# Patient Record
Sex: FEMALE | Race: WHITE | URBAN - METROPOLITAN AREA
[De-identification: names, ages, dates, MRNs, and addresses within clinical notes are randomized per-mention and may not be internally consistent; named-entity substitution may affect disease eponyms.]

---

## 2023-08-03 ENCOUNTER — OFFICE VISIT (OUTPATIENT)
Facility: CLINIC | Age: 38
End: 2023-08-03
Payer: COMMERCIAL

## 2023-08-03 VITALS
OXYGEN SATURATION: 98 % | BODY MASS INDEX: 23.46 KG/M2 | DIASTOLIC BLOOD PRESSURE: 72 MMHG | HEART RATE: 81 BPM | HEIGHT: 66.14 IN | WEIGHT: 146 LBS | SYSTOLIC BLOOD PRESSURE: 118 MMHG

## 2023-08-03 DIAGNOSIS — Z12.4 PAP SMEAR FOR CERVICAL CANCER SCREENING: ICD-10-CM

## 2023-08-03 DIAGNOSIS — Z00.00 ENCOUNTER FOR ROUTINE ADULT HEALTH EXAMINATION WITHOUT ABNORMAL FINDINGS: Primary | ICD-10-CM

## 2023-08-03 PROCEDURE — 99385 PREV VISIT NEW AGE 18-39: CPT | Performed by: FAMILY MEDICINE

## 2023-08-03 PROCEDURE — 87624 HPV HI-RISK TYP POOLED RSLT: CPT | Performed by: FAMILY MEDICINE

## 2023-08-03 PROCEDURE — 3078F DIAST BP <80 MM HG: CPT | Performed by: FAMILY MEDICINE

## 2023-08-03 PROCEDURE — 3074F SYST BP LT 130 MM HG: CPT | Performed by: FAMILY MEDICINE

## 2023-08-03 PROCEDURE — 3008F BODY MASS INDEX DOCD: CPT | Performed by: FAMILY MEDICINE

## 2023-08-04 LAB — HPV I/H RISK 1 DNA SPEC QL NAA+PROBE: NEGATIVE

## 2023-08-18 ENCOUNTER — LAB ENCOUNTER (OUTPATIENT)
Dept: LAB | Facility: REFERENCE LAB | Age: 38
End: 2023-08-18
Attending: FAMILY MEDICINE
Payer: COMMERCIAL

## 2023-08-18 DIAGNOSIS — Z00.00 ENCOUNTER FOR ROUTINE ADULT HEALTH EXAMINATION WITHOUT ABNORMAL FINDINGS: ICD-10-CM

## 2023-08-18 LAB
ALBUMIN SERPL-MCNC: 3.7 G/DL (ref 3.4–5)
ALBUMIN/GLOB SERPL: 1.1 {RATIO} (ref 1–2)
ALP LIVER SERPL-CCNC: 63 U/L
ALT SERPL-CCNC: 26 U/L
ANION GAP SERPL CALC-SCNC: 4 MMOL/L (ref 0–18)
AST SERPL-CCNC: 14 U/L (ref 15–37)
BASOPHILS # BLD AUTO: 0.09 X10(3) UL (ref 0–0.2)
BASOPHILS NFR BLD AUTO: 1.2 %
BILIRUB SERPL-MCNC: 0.8 MG/DL (ref 0.1–2)
BUN BLD-MCNC: 15 MG/DL (ref 7–18)
BUN/CREAT SERPL: 17.4 (ref 10–20)
CALCIUM BLD-MCNC: 8.8 MG/DL (ref 8.5–10.1)
CHLORIDE SERPL-SCNC: 111 MMOL/L (ref 98–112)
CHOLEST SERPL-MCNC: 158 MG/DL (ref ?–200)
CO2 SERPL-SCNC: 27 MMOL/L (ref 21–32)
CREAT BLD-MCNC: 0.86 MG/DL
DEPRECATED RDW RBC AUTO: 45.5 FL (ref 35.1–46.3)
EGFRCR SERPLBLD CKD-EPI 2021: 89 ML/MIN/1.73M2 (ref 60–?)
EOSINOPHIL # BLD AUTO: 0.2 X10(3) UL (ref 0–0.7)
EOSINOPHIL NFR BLD AUTO: 2.8 %
ERYTHROCYTE [DISTWIDTH] IN BLOOD BY AUTOMATED COUNT: 13.3 % (ref 11–15)
EST. AVERAGE GLUCOSE BLD GHB EST-MCNC: 111 MG/DL (ref 68–126)
FASTING PATIENT LIPID ANSWER: NO
FASTING STATUS PATIENT QL REPORTED: NO
GLOBULIN PLAS-MCNC: 3.4 G/DL (ref 2.8–4.4)
GLUCOSE BLD-MCNC: 114 MG/DL (ref 70–99)
HBA1C MFR BLD: 5.5 % (ref ?–5.7)
HCT VFR BLD AUTO: 37.2 %
HDLC SERPL-MCNC: 48 MG/DL (ref 40–59)
HGB BLD-MCNC: 12.2 G/DL
IMM GRANULOCYTES # BLD AUTO: 0.01 X10(3) UL (ref 0–1)
IMM GRANULOCYTES NFR BLD: 0.1 %
LAST PAP RESULT: NORMAL
LDLC SERPL CALC-MCNC: 80 MG/DL (ref ?–100)
LYMPHOCYTES # BLD AUTO: 2.84 X10(3) UL (ref 1–4)
LYMPHOCYTES NFR BLD AUTO: 39.2 %
MCH RBC QN AUTO: 30.4 PG (ref 26–34)
MCHC RBC AUTO-ENTMCNC: 32.8 G/DL (ref 31–37)
MCV RBC AUTO: 92.8 FL
MONOCYTES # BLD AUTO: 0.47 X10(3) UL (ref 0.1–1)
MONOCYTES NFR BLD AUTO: 6.5 %
NEUTROPHILS # BLD AUTO: 3.64 X10 (3) UL (ref 1.5–7.7)
NEUTROPHILS # BLD AUTO: 3.64 X10(3) UL (ref 1.5–7.7)
NEUTROPHILS NFR BLD AUTO: 50.2 %
NONHDLC SERPL-MCNC: 110 MG/DL (ref ?–130)
OSMOLALITY SERPL CALC.SUM OF ELEC: 296 MOSM/KG (ref 275–295)
PAP HISTORY (OTHER THAN LAST PAP): NORMAL
PLATELET # BLD AUTO: 193 10(3)UL (ref 150–450)
POTASSIUM SERPL-SCNC: 4.1 MMOL/L (ref 3.5–5.1)
PROT SERPL-MCNC: 7.1 G/DL (ref 6.4–8.2)
RBC # BLD AUTO: 4.01 X10(6)UL
SODIUM SERPL-SCNC: 142 MMOL/L (ref 136–145)
TRIGL SERPL-MCNC: 179 MG/DL (ref 30–149)
VLDLC SERPL CALC-MCNC: 28 MG/DL (ref 0–30)
WBC # BLD AUTO: 7.3 X10(3) UL (ref 4–11)

## 2023-08-18 PROCEDURE — 36415 COLL VENOUS BLD VENIPUNCTURE: CPT

## 2023-08-18 PROCEDURE — 85025 COMPLETE CBC W/AUTO DIFF WBC: CPT

## 2023-08-18 PROCEDURE — 80053 COMPREHEN METABOLIC PANEL: CPT

## 2023-08-18 PROCEDURE — 83036 HEMOGLOBIN GLYCOSYLATED A1C: CPT

## 2023-08-18 PROCEDURE — 80061 LIPID PANEL: CPT

## 2023-11-29 ENCOUNTER — NURSE TRIAGE (OUTPATIENT)
Facility: CLINIC | Age: 38
End: 2023-11-29

## 2023-11-29 DIAGNOSIS — Z86.39 HISTORY OF THYROID DISEASE: Primary | ICD-10-CM

## 2023-11-29 NOTE — TELEPHONE ENCOUNTER
Action Requested: Summary for Provider     []  Critical Lab, Recommendations Needed  [x] Need Additional Advice  []   FYI    [x]   Need Orders  [] Need Medications Sent to Pharmacy  []  Other     SUMMARY: Per Protocol, See Within 2 Weeks in Office. Patient calling asking if can have an order for TSH blood draw. Very recently had a miscarriage and feels that thyroid may be off. **Patient Datanyzehart message below** Complains of Joint pain to hands, feet, lower back with occasional numbness/tingling to hands and feet. Also complains of Nerve pain to \"ball joint\" of right hand-- when flares rates pain 5/10. 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 Call back or go straight to ER if s/sx worsen questions or concerns. Patient verbalized understanding and agrees with plan. *Last TSH 2.22 on 09/29/23 (Care Everywhere)    Reason for call: Thyroid Problem  Onset: 2-3 months    Denies heart palpitations, sleeping problems, muscle aches/pains, changes in bowels, hair loss, redness or swelling to joints, radiating pain    Has not tried any over the counter medication    From: Seferino Booth  To: Mirna Wesley  Sent: 11/27/2023 10:57 AM CST  Subject: TSH check    Hi Dr. Mayse Odor been experiencing some joint pain, more pronounced back pain etc. I also just had a miscarriage. My TSH level before the miscarriage was at 2.22, which I know is normal for the first trimester. It was, however, higher than my first trimester TSH during my first pregnancy in 2021. So I'm a little worried that something is not quite right with my thyroid and was wondering if I could get it checked out. Or maybe this is just the annoying part of getting older, lol?   Thanks,  Pau Guerrero        Reason for Disposition   Hand or wrist pain is a chronic symptom (recurrent or ongoing AND lasting > 4 weeks)    Protocols used: Hand and Wrist Pain-A-[place]

## 2023-11-30 NOTE — TELEPHONE ENCOUNTER
Jose seen     TSH and message from Dr. Camilo Ralph [phone]  From  Zoe Kent RN To  Joyce Mckinnon and Delivered  11/30/2023  7:43 AM   Last Read in [address]  11/30/2023 12:57 PM by Kami Merida

## 2023-12-02 ENCOUNTER — LAB ENCOUNTER (OUTPATIENT)
Dept: LAB | Facility: HOSPITAL | Age: 38
End: 2023-12-02
Attending: FAMILY MEDICINE
Payer: COMMERCIAL

## 2023-12-02 DIAGNOSIS — Z86.39 HISTORY OF THYROID DISEASE: ICD-10-CM

## 2023-12-02 LAB — TSI SER-ACNC: 1.7 MIU/ML (ref 0.55–4.78)

## 2023-12-02 PROCEDURE — 36415 COLL VENOUS BLD VENIPUNCTURE: CPT

## 2023-12-02 PROCEDURE — 84443 ASSAY THYROID STIM HORMONE: CPT

## 2023-12-07 ENCOUNTER — NURSE TRIAGE (OUTPATIENT)
Facility: CLINIC | Age: 38
End: 2023-12-07

## 2023-12-07 ENCOUNTER — OFFICE VISIT (OUTPATIENT)
Dept: INTERNAL MEDICINE CLINIC | Facility: CLINIC | Age: 38
End: 2023-12-07
Payer: COMMERCIAL

## 2023-12-07 VITALS
WEIGHT: 155 LBS | HEART RATE: 71 BPM | SYSTOLIC BLOOD PRESSURE: 117 MMHG | BODY MASS INDEX: 24.91 KG/M2 | DIASTOLIC BLOOD PRESSURE: 77 MMHG | HEIGHT: 66.14 IN | OXYGEN SATURATION: 98 %

## 2023-12-07 DIAGNOSIS — M54.6 ACUTE LEFT-SIDED THORACIC BACK PAIN: Primary | ICD-10-CM

## 2023-12-07 PROCEDURE — 99213 OFFICE O/P EST LOW 20 MIN: CPT | Performed by: NURSE PRACTITIONER

## 2023-12-07 PROCEDURE — 3008F BODY MASS INDEX DOCD: CPT | Performed by: NURSE PRACTITIONER

## 2023-12-07 PROCEDURE — 3074F SYST BP LT 130 MM HG: CPT | Performed by: NURSE PRACTITIONER

## 2023-12-07 PROCEDURE — 3078F DIAST BP <80 MM HG: CPT | Performed by: NURSE PRACTITIONER

## 2023-12-07 RX ORDER — VALACYCLOVIR HYDROCHLORIDE 1 G/1
1000 TABLET, FILM COATED ORAL AS NEEDED
COMMUNITY

## 2023-12-07 RX ORDER — METHYLPREDNISOLONE 4 MG/1
TABLET ORAL
Qty: 1 EACH | Refills: 0 | Status: SHIPPED | OUTPATIENT
Start: 2023-12-07

## 2023-12-07 RX ORDER — CHOLECALCIFEROL (VITAMIN D3) 25 MCG
1 TABLET,CHEWABLE ORAL DAILY
COMMUNITY

## 2023-12-07 RX ORDER — MULTIVIT-MIN/IRON/FOLIC ACID/K 18-600-40
CAPSULE ORAL
COMMUNITY

## 2023-12-07 RX ORDER — CYCLOBENZAPRINE HCL 10 MG
10 TABLET ORAL NIGHTLY
Qty: 15 TABLET | Refills: 0 | Status: SHIPPED | OUTPATIENT
Start: 2023-12-07 | End: 2023-12-22

## 2023-12-07 NOTE — TELEPHONE ENCOUNTER
Action Requested: Summary for Provider     []  Critical Lab, Recommendations Needed  [] Need Additional Advice  []   FYI    []   Need Orders  [] Need Medications Sent to Pharmacy  []  Other     SUMMARY: patient reports upper back pain radiating to her neck and shoulders. States she noticed the pain after her workout and the pain has become more intense. Patient requesting to be seen by provider. No available appts with provider; however patient agreed to see available NP for an evaluation.    Future Appointments   Date Time Provider Nidhi Alexia   12/7/2023  2:40 PM JOSE G Pandya Asa       Reason for call: Back Pain  Onset: 3 days ago      Reason for Disposition   MODERATE back pain (e.g., interferes with normal activities) and present > 3 days    Protocols used: Back Pain-A-[place]

## 2023-12-07 NOTE — PATIENT INSTRUCTIONS
Plan  1) Ice back for 15 to 20 minutes four times per day. 2) Rest  3) Cyclobenzaprine 10 mg at [facility] May cause drowsiness and may only be able to take it at night. Do not drive while taking this medications. 4) Medrol Eliseo  5) Water with lemon  6) Alkaline foods  7) Ess[employer]ia water  8) Tennis Ball- Roll  9) Sleep with a rolled towel under your neck, no pillow, on your back on a firm mattress     If symptoms do not improve please give the office a call.

## 2023-12-07 NOTE — ASSESSMENT & PLAN NOTE
Back and neck pain. Increased stress with having a miscarriage. She has been eating a high sugar diet lately. Plan  1) Ice back for 15 to 20 minutes four times per day. 2) Rest  3) Cyclobenzaprine 10 mg at [facility] May cause drowsiness and may only be able to take it at night. Do not drive while taking this medications. 4) Medrol Eliseo  5) Water with lemon  6) Alkaline foods  7) Essentia water  8) Tennis Ball- Roll  9) Sleep with a rolled towel under your neck, no pillow, on your back on a firm mattress     If symptoms do not improve please give the office a call.

## 2024-02-02 ENCOUNTER — TELEPHONE (OUTPATIENT)
Facility: CLINIC | Age: 39
End: 2024-02-02

## 2024-02-02 NOTE — TELEPHONE ENCOUNTER
Verified name and .    Patient is requesting appointment to be seen by Dr. Liu only for cough and congestion.    She denies any difficulty breathing or chest pain at this time- able to speak full sentences with no issues.    Appointment scheduled:  Future Appointments   Date Time Provider Department Center   2024 10:30 AM Tosha Liu DO [facility]       Patient as advised to go to walk-in clinic or immediate care for any worsening of symptoms.

## 2024-02-05 ENCOUNTER — OFFICE VISIT (OUTPATIENT)
Facility: CLINIC | Age: 39
End: 2024-02-05
Payer: COMMERCIAL

## 2024-02-05 VITALS
HEIGHT: 66.14 IN | HEART RATE: 73 BPM | DIASTOLIC BLOOD PRESSURE: 70 MMHG | WEIGHT: 153 LBS | BODY MASS INDEX: 24.59 KG/M2 | OXYGEN SATURATION: 99 % | SYSTOLIC BLOOD PRESSURE: 116 MMHG

## 2024-02-05 DIAGNOSIS — R09.81 NASAL CONGESTION: ICD-10-CM

## 2024-02-05 DIAGNOSIS — R05.1 ACUTE COUGH: Primary | ICD-10-CM

## 2024-02-05 PROCEDURE — 99213 OFFICE O/P EST LOW 20 MIN: CPT | Performed by: FAMILY MEDICINE

## 2024-02-05 PROCEDURE — 90686 IIV4 VACC NO PRSV 0.5 ML IM: CPT | Performed by: FAMILY MEDICINE

## 2024-02-05 PROCEDURE — 90471 IMMUNIZATION ADMIN: CPT | Performed by: FAMILY MEDICINE

## 2024-02-05 RX ORDER — FLUTICASONE PROPIONATE 50 MCG
2 SPRAY, SUSPENSION (ML) NASAL DAILY
Qty: 18 ML | Refills: 0 | Status: SHIPPED | OUTPATIENT
Start: 2024-02-05

## 2024-02-05 NOTE — PROGRESS NOTES
CC:    Chief Complaint   Patient presents with    Cough     Cough any congestion since the middle of January.       HPI: 38 year old female here with persistent cough and congestion.  Reports she had frequent bronchitis infections as a child, but has felt like she has been sick constantly this winter.  She worked out last Monday night thinking she was better, but she could not sleep that night due to severe coughing and congestion.  Took an expectorant and Theraflu as well, but that has not helped.  She did have a few days where her breathing was heavier.   Has not had any wheezing.   She did get diagnosed with asthma while pregnant, but this did not persist after pregnancy.     ROS:  General:  No fevers, fatigue, no weight changes  HEENT:  Mild nasal congestion and runny nose, no sore throat   Cardio:  No chest pain  Pulmonary:  Cough and chest congestion, no wheezing, intermittent shortness of breath   GI:  No N/V/D, no abdominal pain   Dermatologic:  No rashes    Past Medical History:   Diagnosis Date    Asthma Fall 2021       Social History     Socioeconomic History    Marital status:      Spouse name: Not on file    Number of children: Not on file    Years of education: Not on file    Highest education level: Not on file   Occupational History    Not on file   Tobacco Use    Smoking status: Never    Smokeless tobacco: Never   Vaping Use    Vaping Use: Never used   Substance and Sexual Activity    Alcohol use: Not Currently     Comment: Very rarely do i have some alcohol    Drug use: Never    Sexual activity: Yes     Partners: Male   Other Topics Concern    Caffeine Concern No    Special Diet No    Weight Concern No    Exercise Yes     Comment: Once or twice a week   Social History Narrative    Not on file     Social Determinants of Health     Financial Resource Strain: Not on file   Food Insecurity: Not on file   Transportation Needs: Not on file   Physical Activity: Not on file   Stress: Not on file    Social Connections: Not on file   Housing Stability: Not on file       Current Outpatient Medications   Medication Sig Dispense Refill    prenatal vitamin with DHA 27-0.8-228 MG Oral Cap Take 1 capsule by mouth daily.      Cholecalciferol (VITAMIN D) 50 MCG (2000 UT) Oral Cap Take by mouth.      valACYclovir 1 G Oral Tab Take 1 tablet (1,000 mg total) by mouth as needed.         Patient has no known allergies.      Vitals:   Vitals:    02/05/24 1031   BP: 116/70   Pulse: 73   SpO2: 99%   Weight: 153 lb (69.4 kg)   Height: 5' 6.14\" (1.68 m)       Body mass index is 24.59 kg/m².    Physical:  General:  Alert, appropriate, no acute distress   HEENT: supple, no tonsillar erythema or exudate, no lymphadenopathy, no rhinorrhea, no sinus tenderness   Cardio:  RRR, no murmurs, S1, S2  Pulmonary:  Clear bilaterally, good air entry, no wheezing, no crackles, no rhonchi   Dermatologic:  No rashes or lesions    Assessment and Plan: 38 year old female here with acute cough and nasal congestion likely due to viral respiratory infection.     1. Acute cough    - Likely due to bronchitis or bronchiolitis without any concerning findings on lung exam today  - Recommend Flonase for possible upper airway cough syndrome and instructions on use given  - Also to consider Prednisone 40 mg daily x 5 days if not improving or worsening in the next week    2. Nasal congestion    - Trial Flonase as above       Tosha Liu DO  02/05/24  10:35 AM

## 2024-04-05 ENCOUNTER — NURSE TRIAGE (OUTPATIENT)
Facility: CLINIC | Age: 39
End: 2024-04-05

## 2024-04-05 ENCOUNTER — PATIENT MESSAGE (OUTPATIENT)
Facility: CLINIC | Age: 39
End: 2024-04-05

## 2024-04-05 NOTE — TELEPHONE ENCOUNTER
Spoke with the patient,verified full name and     Informed her of message and instructions below:    Will start treatment tomorrow if the hives do not go away on there own.    Will call back if she needs appointment

## 2024-04-05 NOTE — TELEPHONE ENCOUNTER
Note she sent an updated message that she saw her allergist and was prescribed Prednisone for possible hives. Only needs to see me if rash does not resolve with treatment, but can find her a slot if needed.

## 2024-04-05 NOTE — TELEPHONE ENCOUNTER
Action Requested: Summary for Provider     []  Critical Lab, Recommendations Needed  [x] Need Additional Advice  []   FYI    []   Need Orders  [] Need Medications Sent to Pharmacy  []  Other     SUMMARY: Patient calling today, has \"blood filled dots\" on hands arms and legs. She had a few of them but after seeing allergy dept yesterday and having allergy testing done this got worse and there are more. She was given new allergy medication to take which she has taken 2 doses.     Patient agreeable to being seen today apt scheduled  with provider in [place] but she would like to see Dr. Liu in next week to follow up. No openings and she asked I send message to see if anywhere she can be fit in.     Advised may not get response on this today, may wait for outcome of visit today with provider to decide next steps.     Also advised to call allergy provider to inform of these dots since seen yesterday and worse today.     Reason for call: Rash  Onset: yesterday but many more today      Reason for Disposition   Purple or blood-colored spots or dots (no fever and sounds well to triager)    Protocols used: Rash or Redness - Widespread-A-[place]

## 2024-06-25 ENCOUNTER — NURSE TRIAGE (OUTPATIENT)
Facility: CLINIC | Age: 39
End: 2024-06-25

## 2024-06-25 NOTE — TELEPHONE ENCOUNTER
Patient contacted. Verified name and date of birth.  Informed her of Dr. Liu's message and verbalized understanding.

## 2024-06-25 NOTE — TELEPHONE ENCOUNTER
Noted and agree with plan of care. Augmentin is a good choice for possible pyelonephritis, but if symptoms not improving or worsening after 48 hours should be seen in the office or go to the ED if symptoms severe.

## 2024-06-25 NOTE — TELEPHONE ENCOUNTER
Did she contact her OB/GYN since they are treating her for this? I don't see any results in the system and would be helpful to be able to review notes or any culture results.

## 2024-06-25 NOTE — TELEPHONE ENCOUNTER
Patient started ABT last evening. She did talk with the OB/GYNE about symptoms she was told it may not be related to UTI she is being treated for. I advised that she could call them back to see about ordering any additional testing based on symptoms which she may do.     I offered appt with Dr. Jarvis today which she declined for now.     Patient is taking tylenol. She expressed she is going to give the ABT a day or two to work and if not improving or symptoms worsen she will call us back or seek care at IC or ER pending symptoms.     Pain is manageable now. C/o back pain that goes to right side today. Was going to left yesterday. No fever, nausea, vomiting. Able to pass urine ok, no visible blood in urine and not passing any clots. No HX of kidney stones, discuss symptoms and when to seek care if develop. ER warning sings given and patient states understanding.     Patient will wait this out a bit and call back if not improving to review symptoms and determine next steps.

## 2024-06-25 NOTE — TELEPHONE ENCOUNTER
Action Requested: Summary for Provider     []  Critical Lab, Recommendations Needed  [x] Need Additional Advice  []   FYI    []   Need Orders  [] Need Medications Sent to Pharmacy  []  Other     SUMMARY: Per protocol, patient should be seen in the office today. Patient unable to come to the office from 12-1:00 pm and 2:30-3:30 pm. Otherwise, can come any time if advised to do so.    Dr. Liu please advise.     Reason for call: Urinary Symptoms (/)  Onset: 6/20    Patient reports of urinary symptoms of frequency, urgency, hesitancy, and burning sensation before and after urination that started on Thursday evening. She drank lots of water and felt better the next day. She still experienced symptoms more in the evening than morning at that time.    She started experiencing back pain, left side, on Sunday. Saw OB-GYN yesterday. Urine was tested and was started on Augmentin (first dose last night). States pain migrated to the right side and mid to lower back during visit to OB-GYN.    Pain is bad enough that she ended up taking Tylenol. Certain movements such as lifting one foot up when standing to put a sock on or weird sitting positions aggravated the pain, \"shooting pain.\"    She has been taking cranberry pills as advised by OB-GYN. Currently remains to have frequency and burning sensation, urgency and hesitancy almost resolved. Denies blood in the urine, states it was only noted on urine testing.     Of note, states menstrual cycle was long - 34 days, and that she might be pregnant but too early to test. Also dealing with allergy symptoms and was put on Allegra but apparently was allergic to it causing hives; seen an allergist. Allergies to cats, grass and anti-histamines.    Reason for Disposition   Side (flank) or lower back pain present    Protocols used: Urinary Symptoms-A-[place]

## 2024-06-26 ENCOUNTER — PATIENT MESSAGE (OUTPATIENT)
Facility: CLINIC | Age: 39
End: 2024-06-26

## 2024-06-26 NOTE — TELEPHONE ENCOUNTER
From: Nedra Jacobo  To: Tosha Liu  Sent: 6/26/2024 5:01 PM CDT  Subject: Back pain, acute UTI     Hi Dr. Liu, I've been in touch with your nurses already about the current back pain that I'm experiencing, possibly related to the UTI. I'm receiving augmentin and while it has helped with the pain a little bit, the pain does increase as the day goes on every day. I'm currently managing it with Tylenol. The pain also migrates from back to abdomen around the bellybutton.   Could we do some imaging to rule out a kidney stone? Or something else? Please keep in mind that I might be pregnant (it's too early to test, but we have been trying).    Thanks so much,  Nedra

## 2024-06-26 NOTE — TELEPHONE ENCOUNTER
Per nurse triage telephone encounter 6/25/24, patient was advised of Dr. Liu's message:      Tosha Liu, DO   to [facility]  Staff       6/25/24 12:25 PM  Note      Noted and agree with plan of care. Augmentin is a good choice for possible pyelonephritis, but if symptoms not improving or worsening after 48 hours should be seen in the office or go to the ED if symptoms severe.           Kolorific message sent.

## 2024-06-27 NOTE — TELEPHONE ENCOUNTER
Please offer appointment with Dr. Stafford tomorrow, 6/28 at 11:30am. 30 minute appointment fine per Dr. Stafford.

## 2024-06-27 NOTE — TELEPHONE ENCOUNTER
Patient called back again stating symptoms are unimproved.  No clinic appointments available to offer patient, she declines visit with another provider.  Nurse recommended immediate care evaluation of symptoms.  She agreed and will go.

## 2024-06-27 NOTE — TELEPHONE ENCOUNTER
Dr. Liu: please note,   Patient will be going to Immediate care today (7/27/24) since her symptoms are not improving (UTI symptoms, back pain).     Patient would like to schedule follow up next week with you.  Can we use SDS slot Wednesday? 7/3/24?

## 2024-06-28 ENCOUNTER — OFFICE VISIT (OUTPATIENT)
Facility: CLINIC | Age: 39
End: 2024-06-28

## 2024-06-28 ENCOUNTER — LAB ENCOUNTER (OUTPATIENT)
Dept: LAB | Facility: REFERENCE LAB | Age: 39
End: 2024-06-28
Attending: FAMILY MEDICINE
Payer: COMMERCIAL

## 2024-06-28 ENCOUNTER — PATIENT MESSAGE (OUTPATIENT)
Facility: CLINIC | Age: 39
End: 2024-06-28

## 2024-06-28 VITALS
OXYGEN SATURATION: 97 % | DIASTOLIC BLOOD PRESSURE: 60 MMHG | HEART RATE: 78 BPM | BODY MASS INDEX: 25 KG/M2 | SYSTOLIC BLOOD PRESSURE: 90 MMHG | WEIGHT: 155.5 LBS

## 2024-06-28 DIAGNOSIS — N93.9 VAGINAL BLEEDING: ICD-10-CM

## 2024-06-28 DIAGNOSIS — R10.9 FLANK PAIN: ICD-10-CM

## 2024-06-28 DIAGNOSIS — N93.9 VAGINAL BLEEDING: Primary | ICD-10-CM

## 2024-06-28 DIAGNOSIS — M54.6 ACUTE BILATERAL THORACIC BACK PAIN: ICD-10-CM

## 2024-06-28 DIAGNOSIS — R10.30 LOWER ABDOMINAL PAIN: Primary | ICD-10-CM

## 2024-06-28 LAB — B-HCG SERPL-ACNC: <2.6 MIU/ML

## 2024-06-28 PROCEDURE — 36415 COLL VENOUS BLD VENIPUNCTURE: CPT

## 2024-06-28 PROCEDURE — 84702 CHORIONIC GONADOTROPIN TEST: CPT

## 2024-06-28 PROCEDURE — 99213 OFFICE O/P EST LOW 20 MIN: CPT | Performed by: FAMILY MEDICINE

## 2024-06-28 RX ORDER — AMOXICILLIN AND CLAVULANATE POTASSIUM 875; 125 MG/1; MG/1
1 TABLET, FILM COATED ORAL
COMMUNITY
Start: 2024-06-24

## 2024-06-28 NOTE — PROGRESS NOTES
Subjective:   Nedra Jacobo is a 39 year old female who presents for Pain (Patient complains of back pain (entire back) x 5 days. The pain also migrates from back to abdomen around the bellybutton. Patient states she had a UTI and is on Augmentin. )     Patient presents for concern of persistent back pain despite antibiotics. Is on augmentin for UTI and concern for pyelonephritis given back pain. No fevers. Patient is actively trying for pregnancy.   Pain worsens with progression of day. Yesterday and today pain has improved. Yesterday did have some bleeding. Pain radiates from around back to umbilicus. Pain will shift from one flank to the other. Now seems to be more right sided. Periods are irregular currently. Patient is unsure if this could be period returning or miscarriage. The pain does feel similar to previous miscarriage. Has not had positive pregnancy test    History/Other:    Chief Complaint Reviewed and Verified  Nursing Notes Reviewed and   Verified  Tobacco Reviewed  Allergies Reviewed  Medications Reviewed    Problem List Reviewed  Medical History Reviewed  Surgical History   Reviewed  Family History Reviewed  Social History Reviewed         Tobacco:  She has never smoked tobacco.    Current Outpatient Medications   Medication Sig Dispense Refill    amoxicillin clavulanate 875-125 MG Oral Tab Take 1 tablet by mouth Q24H PRN.      prenatal vitamin with DHA 27-0.8-228 MG Oral Cap Take 1 capsule by mouth daily.      Cholecalciferol (VITAMIN D) 50 MCG (2000 UT) Oral Cap Take by mouth.      valACYclovir 1 G Oral Tab Take 1 tablet (1,000 mg total) by mouth as needed.      fluticasone propionate 50 MCG/ACT Nasal Suspension 2 sprays by Each Nare route daily. (Patient not taking: Reported on 6/28/2024) 18 mL 0         Review of Systems:  Review of Systems   Constitutional: Negative.    Respiratory: Negative.     Cardiovascular: Negative.    Gastrointestinal: Negative.    Genitourinary:  Positive  for vaginal bleeding.   Musculoskeletal:  Positive for back pain.   Skin: Negative.    Neurological: Negative.        Objective:   BP 90/60 (BP Location: Left arm, Patient Position: Sitting, Cuff Size: adult)   Pulse 78   Wt 155 lb 8 oz (70.5 kg)   LMP 06/27/2024   SpO2 97%   BMI 24.99 kg/m²  Estimated body mass index is 24.99 kg/m² as calculated from the following:    Height as of 2/5/24: 5' 6.14\" (1.68 m).    Weight as of this encounter: 155 lb 8 oz (70.5 kg).      Assessment & Plan:   1. Vaginal bleeding (Primary)  2. Acute bilateral thoracic back pain  -     HCG, Beta Subunit (Quant Pregnancy Test); Future; Expected date: 06/28/2024    -ordered beta hcg to assess for potential miscarriage. If positive will order other monitoring labs. If negative will  proceed with ultrasound to rule out nephrolithiasis.     Return follow up pending lab results.    Gwen Stafford MD, 6/28/2024, 1:42 PM

## 2024-06-28 NOTE — TELEPHONE ENCOUNTER
Outgoing call to patient and left a detail message to call our office back .    NOTE : [place] Note for more details .

## 2024-06-28 NOTE — TELEPHONE ENCOUNTER
11:30 am appointment no longer available. It looks like I have a fast pass slot open at 1:30. Please offer that slot. I will schedule the appointment to hold the slot. If patient unable to make that time please cancel     Gwen Stafford MD, 06/28/24, 8:14 AM

## 2024-06-30 NOTE — TELEPHONE ENCOUNTER
From: Nedra Jacobo  To: Gwen Stafford  Sent: 6/28/2024 6:43 PM CDT  Subject: Negative HCG    Hi Dr. Stafford,    With the beta HCG coming back negative, could we please get some imaging scheduled? Any other steps I should take?    Thanks,  Jose

## 2024-07-01 ENCOUNTER — TELEPHONE (OUTPATIENT)
Facility: CLINIC | Age: 39
End: 2024-07-01

## 2024-07-01 ENCOUNTER — HOSPITAL ENCOUNTER (OUTPATIENT)
Dept: ULTRASOUND IMAGING | Facility: HOSPITAL | Age: 39
Discharge: HOME | End: 2024-07-01
Attending: FAMILY MEDICINE
Payer: COMMERCIAL

## 2024-07-01 DIAGNOSIS — R10.9 FLANK PAIN: ICD-10-CM

## 2024-07-01 DIAGNOSIS — R10.30 LOWER ABDOMINAL PAIN: ICD-10-CM

## 2024-07-01 PROCEDURE — 76775 US EXAM ABDO BACK WALL LIM: CPT | Performed by: FAMILY MEDICINE

## 2024-07-09 ENCOUNTER — LAB ENCOUNTER (OUTPATIENT)
Dept: LAB | Age: 39
End: 2024-07-09
Attending: FAMILY MEDICINE
Payer: COMMERCIAL

## 2024-07-09 ENCOUNTER — OFFICE VISIT (OUTPATIENT)
Facility: CLINIC | Age: 39
End: 2024-07-09
Payer: COMMERCIAL

## 2024-07-09 VITALS
HEART RATE: 106 BPM | BODY MASS INDEX: 25 KG/M2 | WEIGHT: 155 LBS | DIASTOLIC BLOOD PRESSURE: 60 MMHG | OXYGEN SATURATION: 96 % | SYSTOLIC BLOOD PRESSURE: 96 MMHG

## 2024-07-09 DIAGNOSIS — R79.0 LOW FERRITIN: ICD-10-CM

## 2024-07-09 DIAGNOSIS — M54.50 ACUTE BILATERAL LOW BACK PAIN, UNSPECIFIED WHETHER SCIATICA PRESENT: Primary | ICD-10-CM

## 2024-07-09 DIAGNOSIS — R79.89 ABNORMAL CBC MEASUREMENT: Primary | ICD-10-CM

## 2024-07-09 DIAGNOSIS — E55.9 VITAMIN D DEFICIENCY: ICD-10-CM

## 2024-07-09 LAB
BASOPHILS # BLD AUTO: 0.08 X10(3) UL (ref 0–0.2)
BASOPHILS NFR BLD AUTO: 1.6 %
DEPRECATED HBV CORE AB SER IA-ACNC: 30.7 NG/ML
DEPRECATED RDW RBC AUTO: 44.4 FL (ref 35.1–46.3)
EOSINOPHIL # BLD AUTO: 0.1 X10(3) UL (ref 0–0.7)
EOSINOPHIL NFR BLD AUTO: 1.9 %
ERYTHROCYTE [DISTWIDTH] IN BLOOD BY AUTOMATED COUNT: 13.2 % (ref 11–15)
HCT VFR BLD AUTO: 37.9 %
HGB BLD-MCNC: 12.4 G/DL
IMM GRANULOCYTES # BLD AUTO: 0.01 X10(3) UL (ref 0–1)
IMM GRANULOCYTES NFR BLD: 0.2 %
IRON SATN MFR SERPL: 42 %
IRON SERPL-MCNC: 111 UG/DL
LYMPHOCYTES # BLD AUTO: 2.06 X10(3) UL (ref 1–4)
LYMPHOCYTES NFR BLD AUTO: 39.9 %
MCH RBC QN AUTO: 30.1 PG (ref 26–34)
MCHC RBC AUTO-ENTMCNC: 32.7 G/DL (ref 31–37)
MCV RBC AUTO: 92 FL
MONOCYTES # BLD AUTO: 0.39 X10(3) UL (ref 0.1–1)
MONOCYTES NFR BLD AUTO: 7.6 %
NEUTROPHILS # BLD AUTO: 2.52 X10 (3) UL (ref 1.5–7.7)
NEUTROPHILS # BLD AUTO: 2.52 X10(3) UL (ref 1.5–7.7)
NEUTROPHILS NFR BLD AUTO: 48.8 %
PLATELET # BLD AUTO: 197 10(3)UL (ref 150–450)
PLATELETS.RETICULATED NFR BLD AUTO: 20.1 % (ref 0–7)
RBC # BLD AUTO: 4.12 X10(6)UL
TIBC SERPL-MCNC: 265 UG/DL (ref 250–425)
TRANSFERRIN SERPL-MCNC: 178 MG/DL (ref 250–380)
VIT D+METAB SERPL-MCNC: 47.2 NG/ML (ref 30–100)
WBC # BLD AUTO: 5.2 X10(3) UL (ref 4–11)

## 2024-07-09 PROCEDURE — 84466 ASSAY OF TRANSFERRIN: CPT

## 2024-07-09 PROCEDURE — 99214 OFFICE O/P EST MOD 30 MIN: CPT | Performed by: FAMILY MEDICINE

## 2024-07-09 PROCEDURE — 36415 COLL VENOUS BLD VENIPUNCTURE: CPT

## 2024-07-09 PROCEDURE — 85025 COMPLETE CBC W/AUTO DIFF WBC: CPT

## 2024-07-09 PROCEDURE — 82306 VITAMIN D 25 HYDROXY: CPT

## 2024-07-09 PROCEDURE — 83540 ASSAY OF IRON: CPT

## 2024-07-09 PROCEDURE — 82728 ASSAY OF FERRITIN: CPT

## 2024-07-09 NOTE — PROGRESS NOTES
Subjective:   Nedra Jacobo is a 39 year old female who presents for Pain (Patient comes to the office to follow up on mid-back pain. )     Patient presents for follow up back pain from previous visit:     Pain worsens with progression of day. Pain radiates from around back to umbilicus. Pain will shift from one flank to the other. Patient did begin to experience some vaginal bleeding. Patient had been placed on empiric antibiotics by another provider for concern for pyleo. Of note actively trying for pregnancy and pain at previous visit did seem similar to previous miscarriage pain. Pregnancy tests negative. Beta hcg quant done after last visit was negative. Renal ultrasound was negative for calculi.       Since last visit pain has resolved. Was feeling carla \"twingy\" on right flank. Is now having different pelvic discomfort. Patient attributes to ovulation pain which is normal for her. No new vaginal discharge    On a side note patient is taking iron and is unsure if needs to keep taking or not. Is also taking vitamin D for history of vitamin d deficiency and has not had recent levels checked. Currently taking prenatal and 3-4 tablets of 1000 international unit vitamin D.     History/Other:    Chief Complaint Reviewed and Verified  Nursing Notes Reviewed and   Verified  Tobacco Reviewed  Allergies Reviewed  Medications Reviewed    Problem List Reviewed  Medical History Reviewed  Surgical History   Reviewed  Family History Reviewed  Social History Reviewed         Tobacco:  She has never smoked tobacco.    Current Outpatient Medications   Medication Sig Dispense Refill    prenatal vitamin with DHA 27-0.8-228 MG Oral Cap Take 1 capsule by mouth daily.      Cholecalciferol (VITAMIN D) 50 MCG (2000 UT) Oral Cap Take by mouth.      valACYclovir 1 G Oral Tab Take 1 tablet (1,000 mg total) by mouth as needed.      fluticasone propionate 50 MCG/ACT Nasal Suspension 2 sprays by Each Nare route daily. (Patient  not taking: Reported on 7/9/2024) 18 mL 0         Review of Systems:  Review of Systems   Constitutional: Negative.    Respiratory: Negative.     Cardiovascular: Negative.    Gastrointestinal: Negative.    Musculoskeletal:  Positive for back pain (resolved).   Skin: Negative.    Neurological: Negative.        Objective:   BP 96/60 (BP Location: Left arm, Patient Position: Sitting, Cuff Size: adult)   Pulse 106   Wt 155 lb (70.3 kg)   LMP 06/27/2024   SpO2 96%   BMI 24.91 kg/m²  Estimated body mass index is 24.91 kg/m² as calculated from the following:    Height as of 2/5/24: 5' 6.14\" (1.68 m).    Weight as of this encounter: 155 lb (70.3 kg).  Physical Exam  Vitals and nursing note reviewed.   Constitutional:       General: She is not in acute distress.     Appearance: Normal appearance.   HENT:      Head: Normocephalic and atraumatic.   Eyes:      General:         Right eye: No discharge.         Left eye: No discharge.      Comments: Extraocular eye movements grossly intact   Pulmonary:      Effort: Pulmonary effort is normal.   Abdominal:      General: Abdomen is flat. Bowel sounds are normal. There is no distension.      Palpations: Abdomen is soft. There is no mass.      Tenderness: There is abdominal tenderness (mild tenderness to left lower quadrant). There is no right CVA tenderness, left CVA tenderness, guarding or rebound.   Musculoskeletal:      Comments: Normal gait    No tenderness to spine. No step-offs appreciated  No significant muscular tension noted     Skin:     Findings: No rash.   Neurological:      General: No focal deficit present.      Mental Status: She is alert. Mental status is at baseline.   Psychiatric:         Mood and Affect: Mood normal.         Behavior: Behavior normal.         Assessment & Plan:   1. Acute bilateral low back pain, unspecified whether sciatica present (Primary)    -resolved. Exam in office normal and unrevealing    2. Low ferritin  -     CBC With Differential  With Platelet; Future; Expected date: 07/09/2024  -     Ferritin; Future; Expected date: 07/09/2024  -     Iron And Tibc; Future; Expected date: 07/09/2024    -ordered updated levels to assist in guiding patient in further iron dosing adjustments    3. Vitamin D deficiency  -     Vitamin D; Future; Expected date: 07/09/2024    -ordered updated levels to assist guiding patient on any vitamin D dose adjustments      Return if symptoms worsen or fail to improve.    Gwen Stafford MD, 7/9/2024, 9:03 AM

## 2024-08-19 ENCOUNTER — OFFICE VISIT (OUTPATIENT)
Dept: OBGYN CLINIC | Facility: CLINIC | Age: 39
End: 2024-08-19
Payer: COMMERCIAL

## 2024-08-19 VITALS
DIASTOLIC BLOOD PRESSURE: 62 MMHG | SYSTOLIC BLOOD PRESSURE: 100 MMHG | HEIGHT: 66.14 IN | BODY MASS INDEX: 24.75 KG/M2 | WEIGHT: 154 LBS

## 2024-08-19 DIAGNOSIS — O09.529 ANTEPARTUM MULTIGRAVIDA OF ADVANCED MATERNAL AGE (HCC): ICD-10-CM

## 2024-08-19 DIAGNOSIS — Z36.8A ENCOUNTER FOR ANTENATAL SCREENING FOR OTHER GENETIC DEFECT (HCC): ICD-10-CM

## 2024-08-19 DIAGNOSIS — N92.6 MISSED MENSES: Primary | ICD-10-CM

## 2024-08-19 PROBLEM — Z98.891 PREVIOUS CESAREAN SECTION: Status: ACTIVE | Noted: 2024-08-19

## 2024-08-19 LAB
CONTROL LINE PRESENT WITH A CLEAR BACKGROUND (YES/NO): YES YES/NO
KIT LOT #: NORMAL NUMERIC
PREGNANCY TEST, URINE: POSITIVE

## 2024-08-19 RX ORDER — METOCLOPRAMIDE 10 MG/1
10 TABLET ORAL EVERY 6 HOURS PRN
Qty: 30 TABLET | Refills: 3 | Status: SHIPPED | OUTPATIENT
Start: 2024-08-19

## 2024-08-19 NOTE — PROGRESS NOTES
[facility]  Obstetrics and Gynecology    Chief Complaint:   Chief Complaint   Patient presents with    Pregnancy     Confirmation        Subjective:     Nedra Jacobo is a 39 year old ,female, Patient's last menstrual period was 2024 (exact date). presents with missed menses and positive pregnancy test.   This is a planned pregnancy.  Pregnancy confirmation    Trying for pregnancy for about 2 months - had US last week (6w5d on ) , TAMIKO 4/3/25     to     Patient's last menstrual period was 2024 (exact date).  Normal period, had a cycle a couple months before that was a little longer than usual.    No bleeding  + urinary frequency  + nausea, no vomiting - drinking water and regular eating    Menarche: 13 yrs old (2024 10:17 AM)  Period Cycle (Days): every month , but before LMP 34 days long - twice (2024 10:17 AM)  Period Duration (Days): 3 days and then starts again, 3-7 days (2024 10:17 AM)  Period Flow: varies, can be pretty heavy for 1-2days (2024 10:17 AM)  Use of Birth Control (if yes, specify type): None (2024 10:17 AM)  Date When Birth Control Last Used: OCPs last used  (2024 10:17 AM)  Hx Prior Abnormal Pap: No (2024 10:17 AM)  Pap Date: 23 (2024 10:17 AM)  Pap Result Notes: WNL (2024 10:17 AM)      Review of Systems:  General: no complaints  Eyes: no complaints  Respiratory: no complaints  Cardiovascular: no complaints  GI: no complaints  : no complaints See HPI  Hematological/lymphatic: no complaints  Breast: no complaints  Psychiatric: no complaints  Endocrine:no complaints  Neurological: no complaints  Immunological: no complaints  Musculoskeletal:no complaints    OB History    Para Term  AB Living   3 1 1 0 1 1   SAB IAB Ectopic Multiple Live Births   1 0 0 0 1       Past Medical History:    Asthma (HCC)       Past Surgical History:   Procedure Laterality Date       2022    Due to breach position       Social History     Socioeconomic History    Marital status:    Tobacco Use    Smoking status: Never    Smokeless tobacco: Never   Vaping Use    Vaping status: Never Used   Substance and Sexual Activity    Alcohol use: Not Currently     Comment: Very rarely do i have some alcohol    Drug use: Never    Sexual activity: Yes     Partners: Male   Other Topics Concern    Caffeine Concern No    Special Diet No    Weight Concern No    Exercise Yes     Comment: Once or twice a week    Blood Transfusions No       Family History   Problem Relation Age of Onset    Hypertension Father     Pulmonary Disease Father         COPD    Renal Disease Father     Asthma Mother     Depression Maternal Grandmother     Psoriasis Brother          Current Outpatient Medications:     Ferrous Gluconate (IRON 27 OR), Take by mouth., Disp: , Rfl:     metoclopramide (REGLAN) 10 MG Oral Tab, Take 1 tablet (10 mg total) by mouth every 6 (six) hours as needed., Disp: 30 tablet, Rfl: 3    prenatal vitamin with DHA 27-0.8-228 MG Oral Cap, Take 1 capsule by mouth daily., Disp: , Rfl:     Cholecalciferol (VITAMIN D) 50 MCG (2000 UT) Oral Cap, Take by mouth., Disp: , Rfl:     valACYclovir 1 G Oral Tab, Take 1 tablet (1,000 mg total) by mouth as needed., Disp: , Rfl:     fluticasone propionate 50 MCG/ACT Nasal Suspension, 2 sprays by Each Nare route daily. (Patient not taking: Reported on 2024), Disp: 18 mL, Rfl: 0      Health maintenance:  Health Maintenance   Topic Date Due    COVID-19 Vaccine ( season) 2023    Annual Physical  2024    Influenza Vaccine (1) 10/01/2024    Pap Smear  2026    DTaP,Tdap,and Td Vaccines (2 - Td or Tdap) 2032    Annual Depression Screening  Completed    Pneumococcal Vaccine: Birth to 64yrs  Aged Out        Objective:     Vitals:    24 1015   BP: 100/62   Weight: 154 lb (69.9 kg)   Height: 66.14\"         Body mass index is  24.75 kg/m².    GENERAL: well developed, well nourished, in no apparent distress, alert and orientated X 3  PSYCH: mood and affect stable   SKIN: no rashes, no lesions  HEENT: normal  LUNGS: respiration unlabored  CARDIOVASCULAR: no peripheral edema or varicosities, skin warm and dry  ABDOMEN: Soft, non distended; non tender, no masses  EXTREMITIES:  Nontender without edema    Labs:  POC urine pregnancy test: Positive       Assessment:     Nedra Jacobo is a 39 year old  female who presents for missed menses and positive pregnancy test    Patient Active Problem List   Diagnosis    Acute left-sided thoracic back pain         Plan:       ICD-10-CM    1. Missed menses  N92.6 Urine Preg Test [65002]     Maternal Fetal Medicine Referral - [place] Location      2. Encounter for  screening for other genetic defect (HCC)  Z36.8A Maternal Fetal Medicine Referral - [place] Location        Missed menses  - positive pregnancy test  - US completed at outside location, records requested  - Pt counseled on safety, diet, exercise, caffiene, tobacco, ETOH, sexual activity, ER precautions, diet, exercise, appropriate otc medication use, substance abuse   - Counseled on taking a PNV with folic acid   - genetic screening testing options reviewed - she states her  wants all testing done. Specifically brought up chorionic villous sampling. We reviewed the risks associated with this, recommend she talk with MFM who would be the ones to do this, however could consider nuchal translucency US with cfDNA. MFM referral placed.  - SAB precautions provided   - d/w nausea and vomiting in pregnancy including vitamin B 6 and unisom     AMA  - will plan for level 2 US    Previous   - for breech  - interested in TOLAC    All of the findings and plan were discussed with the patient.  She notes understanding and agrees with the plan of care.  All questions were answered to the best of my ability at this  time.    RTC in 4 weeks or sooner if needed     DO AYANNA Hassan

## 2024-08-26 ENCOUNTER — DOCUMENTATION ONLY (OUTPATIENT)
Dept: OBGYN CLINIC | Facility: CLINIC | Age: 39
End: 2024-08-26

## 2024-08-26 NOTE — PROGRESS NOTES
OB US report from outside    8/13/24:  CRL 0.77 cm, 6w5d --> TAMIKO 4/3/25   bpm  Left corpus luteum cyst

## 2024-09-09 ENCOUNTER — TELEPHONE (OUTPATIENT)
Dept: OBGYN CLINIC | Facility: CLINIC | Age: 39
End: 2024-09-09

## 2024-09-09 ENCOUNTER — LAB ENCOUNTER (OUTPATIENT)
Dept: LAB | Age: 39
End: 2024-09-09
Attending: OBSTETRICS & GYNECOLOGY
Payer: COMMERCIAL

## 2024-09-09 DIAGNOSIS — Z34.81 ENCOUNTER FOR SUPERVISION OF OTHER NORMAL PREGNANCY IN FIRST TRIMESTER (HCC): Primary | ICD-10-CM

## 2024-09-09 DIAGNOSIS — Z34.81 ENCOUNTER FOR SUPERVISION OF OTHER NORMAL PREGNANCY IN FIRST TRIMESTER (HCC): ICD-10-CM

## 2024-09-09 LAB
ANTIBODY SCREEN: NEGATIVE
BASOPHILS # BLD AUTO: 0.07 X10(3) UL (ref 0–0.2)
BASOPHILS NFR BLD AUTO: 1 %
DEPRECATED RDW RBC AUTO: 45.5 FL (ref 35.1–46.3)
EOSINOPHIL # BLD AUTO: 0.06 X10(3) UL (ref 0–0.7)
EOSINOPHIL NFR BLD AUTO: 0.9 %
ERYTHROCYTE [DISTWIDTH] IN BLOOD BY AUTOMATED COUNT: 13.5 % (ref 11–15)
HBV SURFACE AG SER-ACNC: <0.1 [IU]/L
HBV SURFACE AG SERPL QL IA: NONREACTIVE
HCT VFR BLD AUTO: 35.2 %
HCV AB SERPL QL IA: NONREACTIVE
HGB BLD-MCNC: 11.6 G/DL
IMM GRANULOCYTES # BLD AUTO: 0.02 X10(3) UL (ref 0–1)
IMM GRANULOCYTES NFR BLD: 0.3 %
LYMPHOCYTES # BLD AUTO: 1.93 X10(3) UL (ref 1–4)
LYMPHOCYTES NFR BLD AUTO: 28.7 %
MCH RBC QN AUTO: 30.4 PG (ref 26–34)
MCHC RBC AUTO-ENTMCNC: 33 G/DL (ref 31–37)
MCV RBC AUTO: 92.1 FL
MONOCYTES # BLD AUTO: 0.4 X10(3) UL (ref 0.1–1)
MONOCYTES NFR BLD AUTO: 6 %
NEUTROPHILS # BLD AUTO: 4.24 X10 (3) UL (ref 1.5–7.7)
NEUTROPHILS # BLD AUTO: 4.24 X10(3) UL (ref 1.5–7.7)
NEUTROPHILS NFR BLD AUTO: 63.1 %
PLATELET # BLD AUTO: 187 10(3)UL (ref 150–450)
PLATELETS.RETICULATED NFR BLD AUTO: 21.9 % (ref 0–7)
RBC # BLD AUTO: 3.82 X10(6)UL
RH BLOOD TYPE: NEGATIVE
RUBV IGG SER QL: POSITIVE
RUBV IGG SER-ACNC: 50 IU/ML (ref 10–?)
T PALLIDUM AB SER QL IA: NONREACTIVE
WBC # BLD AUTO: 6.7 X10(3) UL (ref 4–11)

## 2024-09-09 PROCEDURE — 87389 HIV-1 AG W/HIV-1&-2 AB AG IA: CPT

## 2024-09-09 PROCEDURE — 83021 HEMOGLOBIN CHROMOTOGRAPHY: CPT

## 2024-09-09 PROCEDURE — 87086 URINE CULTURE/COLONY COUNT: CPT

## 2024-09-09 PROCEDURE — 36415 COLL VENOUS BLD VENIPUNCTURE: CPT

## 2024-09-09 PROCEDURE — 87340 HEPATITIS B SURFACE AG IA: CPT

## 2024-09-09 PROCEDURE — 86762 RUBELLA ANTIBODY: CPT

## 2024-09-09 PROCEDURE — 85025 COMPLETE CBC W/AUTO DIFF WBC: CPT

## 2024-09-09 PROCEDURE — 86780 TREPONEMA PALLIDUM: CPT

## 2024-09-09 PROCEDURE — 86900 BLOOD TYPING SEROLOGIC ABO: CPT

## 2024-09-09 PROCEDURE — 86901 BLOOD TYPING SEROLOGIC RH(D): CPT

## 2024-09-09 PROCEDURE — 86850 RBC ANTIBODY SCREEN: CPT

## 2024-09-09 PROCEDURE — 83020 HEMOGLOBIN ELECTROPHORESIS: CPT

## 2024-09-09 PROCEDURE — 86803 HEPATITIS C AB TEST: CPT

## 2024-09-09 NOTE — TELEPHONE ENCOUNTER
RN spoke with pt. Pt is concerned about genetic testing due to her age, so RN told pt RN would order labs today so she can have them drawn this week. RN started Prequel paperwork and told pt to ask to speak to a RN when she arrives. RN told pt that she can come in M-TH at 8-4 or Friday at 8-3. Pt verbalized understanding and agreed with plan of care.

## 2024-09-09 NOTE — TELEPHONE ENCOUNTER
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 She wanted to know if she should have her blood work done this week or wait until next week when her nurse education class is scheduled.

## 2024-09-11 LAB
HGB A2 MFR BLD: 2.8 % (ref 1.5–3.5)
HGB PNL BLD ELPH: 97.2 % (ref 95.5–100)

## 2024-09-16 ENCOUNTER — NURSE ONLY (OUTPATIENT)
Dept: OBGYN CLINIC | Facility: CLINIC | Age: 39
End: 2024-09-16
Payer: COMMERCIAL

## 2024-09-16 NOTE — PROGRESS NOTES
Pt is a   here today for RN OB Education.       Pregnancy Confirmation apt with:  with RD    LMP:     US:  (6w5d)    Working TAMIKO: 4/3/25    Pre  BMI: 23.79    Medical Hx significant for: asthma    Obstetrical Hx significant for: SAB (),  ()    Surgical Hx significant for:  ()    EPDS score: 6    Early GTT screening: does not meet criteria    Preeclampsia prevention screening: does not meet criteria.     OUD Screening: Patient has answered NO to 5p questions and has no  risk factors.     Patient given \"What Pregnant Women Need to Know\" handout.     Educational material reviewed with patient: Prenatal care, nutrition, weight gain recommendations, travel, exercise, intercourse, pregnancy changes, safe medications, pregnancy and work, fetal movement, labor and  labor, warning signs, food safety, tdap, cord blood, breastfeeding, circumcision, and Group B strep.     Pt agrees to blood transfusion if needed: Yes    PN labs ordered: Previously completed on     Optional genetic screening discussed.  Pt desires Prequel and previously completed [facility].    [facility] Media Policy: Reviewed and verbalized understanding.     NOB appt:  with MA    Lab appt:

## 2024-09-19 ENCOUNTER — HOSPITAL ENCOUNTER (OUTPATIENT)
Dept: PERINATAL CARE | Facility: HOSPITAL | Age: 39
Discharge: HOME | End: 2024-09-19
Attending: OBSTETRICS & GYNECOLOGY
Payer: COMMERCIAL

## 2024-09-19 VITALS
BODY MASS INDEX: 25 KG/M2 | SYSTOLIC BLOOD PRESSURE: 115 MMHG | WEIGHT: 154 LBS | DIASTOLIC BLOOD PRESSURE: 76 MMHG | HEART RATE: 80 BPM

## 2024-09-19 DIAGNOSIS — O09.521 AMA (ADVANCED MATERNAL AGE) MULTIGRAVIDA 35+, FIRST TRIMESTER (HCC): ICD-10-CM

## 2024-09-19 DIAGNOSIS — Z36.9 FIRST TRIMESTER SCREENING (HCC): ICD-10-CM

## 2024-09-19 DIAGNOSIS — Z36.9 FIRST TRIMESTER SCREENING (HCC): Primary | ICD-10-CM

## 2024-09-19 DIAGNOSIS — Z98.891 PREVIOUS CESAREAN SECTION: ICD-10-CM

## 2024-09-19 DIAGNOSIS — Z36.8A ENCOUNTER FOR ANTENATAL SCREENING FOR OTHER GENETIC DEFECT (HCC): ICD-10-CM

## 2024-09-19 PROCEDURE — 76813 OB US NUCHAL MEAS 1 GEST: CPT | Performed by: OBSTETRICS & GYNECOLOGY

## 2024-09-19 NOTE — PROGRESS NOTES
Outpatient Maternal-Fetal Medicine Consultation    Dear Dr. Kwon,    Thank you for requesting ultrasound evaluation and maternal fetal medicine consultation on your patient Nedra Jacobo.  As you are aware she is a 39 year old female with a [place] pregnancy at 12w0d.  A maternal-fetal medicine consultation was requested secondary to advanced maternal age.  Her prenatal records and labs were reviewed.    HISTORY  OB History    Para Term  AB Living   3 1 1 0 1 1   SAB IAB Ectopic Multiple Live Births   1 0 0 0 1     # 1 - Date: 22, Sex: Male, Weight: 7 lb 4.9 oz (3.315 kg), GA: 39w1d, Type: , Low Transverse, Apgar1: 8, Apgar5: 9, Living: Living, Birth Comments: None    # 2 - Date: 10/01/23, Sex: None, Weight: None, GA: None, Type: None, Apgar1: None, Apgar5: None, Living: None, Birth Comments: None    # 3 - Date: None, Sex: None, Weight: None, GA: None, Type: None, Apgar1: None, Apgar5: None, Living: None, Birth Comments: None    Past Medical History  The patient  has a past medical history of Asthma ([facility]) (2021) and Lyme disease.    Past Surgical History  The patient  has a past surgical history that includes  (2022).    Family History  The patient She indicated that the status of her mother is unknown. She indicated that the status of her father is unknown. She indicated that her brother is alive. She indicated that the status of her maternal grandmother is unknown. She indicated that the status of her son is unknown.      Medications:   Current Outpatient Medications:     Ferrous Gluconate (IRON 27 OR), Take by mouth., Disp: , Rfl:     metoclopramide (REGLAN) 10 MG Oral Tab, Take 1 tablet (10 mg total) by mouth every 6 (six) hours as needed. (Patient not taking: Reported on 2024), Disp: 30 tablet, Rfl: 3    fluticasone propionate 50 MCG/ACT Nasal Suspension, 2 sprays by Each Nare route daily. (Patient not taking: Reported on 2024), Disp: 18 mL, Rfl:  0    prenatal vitamin with DHA 27-0.8-228 MG Oral Cap, Take 1 capsule by mouth daily., Disp: , Rfl:     Cholecalciferol (VITAMIN D) 50 MCG (2000 UT) Oral Cap, Take by mouth., Disp: , Rfl:     valACYclovir 1 G Oral Tab, Take 1 tablet (1,000 mg total) by mouth as needed., Disp: , Rfl:   Allergies:   Allergies   Allergen Reactions    Allegra [Fexofenadine] HIVES    Antihistamine & Nasal Deconges [Fexofenadine-Pseudoephedrine] HIVES    Levaquin MYALGIA     Joint pain    Bactrim [Sulfamethoxazole W/Trimethoprim] TINITUS         PHYSICAL EXAMINATION:  /76   Pulse 80   Wt 154 lb (69.9 kg)   LMP 06/27/2024 (Exact Date)   BMI 24.75 kg/m²   General: alert and oriented,no acute distress  Abdomen: gravid, soft, non-tender  Extremities: non-tender, no edema      OBSTETRIC ULTRASOUND  The patient had a first trimester ultrasound today which I interpreted the results and reviewed them with the patient.    Single IUP with cardiac activity 166 bpm  Amniotic fluid is normal.  Placenta location is anterior  The CRL is consistent with gestational age. Nasal bone present. The nuchal translucency measures 1.1 mm. This is within normal limits.   The maternal uterus and ovaries appear normal.    See imaging tab for the complete US report.    DISCUSSION  During her visit we discussed and reviewed the following issues:  ADVANCED MATERNAL AGE    Background  I reviewed with the patient that pregnancies in women of advanced maternal age (35 or older at delivery) are associated with elevated risks. Specifically, there is a higher rate of:  Fetal malformations  Preeclampsia  Gestational diabetes  Intrauterine fetal death    As a result, enhanced pregnancy surveillance is advised for these patients including a comprehensive ultrasound to assess for fetal malformations (at 20 weeks) and a third trimester ultrasound assessment for fetal growth (at 32 weeks). In addition, weekly NST's (initiating at 36 weeks gestation for women 35-39 years  and at 32 weeks gestation for women 40 years and older) are also advised. Routine obstetric care is more than adequate to assess for gestational diabetes and preeclampsia; hence, no further significant alterations in obstetric care are advised.    Medical Complications    Women 35 years of age or older can expect to experience two to three fold higher rates of hospitalization,  delivery, and pregnancy-related complications when compared to their younger counterparts.  The two most common medical problems complicating these  pregnanccies are hypertension and diabetes.   The incidence of preeclampsia in the general obstetric population is 3 to 4 percent; this increases to 5 to 10 percent in women over age 40 and is as high as 35 percent in women over age 50.   The incidence of gestational diabetes in the general obstetric population is 3 percent, rising to 7 to 12 percent in women over age 40 and 20 percent in women over age 50.  Women 35 years of age or older are more likely to be delivered by . The  delivery rate in the general obstetric population of the [place] is almost 30 percent, compared to almost 50 percent in women over age 40 to 45 and almost 80 percent in women age 50 to 63.          Fetal Death        A decision analysis tool using data from the [employer]  Database predicted a strategy of weekly antepartum testing and labor induction would lower the risk of unexplained fetal death in women 35 years of age or older. In this model, weekly testing starting at 36 weeks of gestation would drop the risk of fetal death from 5.2 to 1.3 per 1000 pregnancies. While a policy of antepartum testing in older women does increase the chance that a women will be induced (71 inductions per fetal death averted) and thereby increases her risk of having a  delivery, only 14 additional cesareans would need to be performed to avert one unexplained fetal death.  Hence,  weekly NST's are advised for women of advanced maternal age; testing should be initiated at 36 weeks for women 35-39 years and at 32 weeks for women 40 years and older.    Fetal Malformations    Cardiac malformations, clubfoot, and diaphragmatic hernia appear to occur with increased frequency in offspring of older women. These abnormalities are structural and unrelated to aneuploidy, thus they would not be detected by karyotype analysis.  For these reasons a complete, detailed ultrasound (level II) is advised even if the fetus has a normal karyotype.    Fetal Aneuploidy  We also discussed the increased risk of chromosomal abnormalities associated with advanced maternal age. I reviewed that an ultrasound examination cannot reliably exclude potential genetic abnormalities. Given that the patient will be 39 years old at the time of delivery I reviewed that her risk (at amniocentesis) of having a fetus with any chromosome abnormality is 1:50 and with trisomy 21 is 1: 90.    Invasive Testing  I offered invasive genetic testing (amniocentesis, chorionic villus sampling) after reviewing the diagnostic accuracy of these tests as well as the procedure associated loss rate (1:500 for genetic amniocentesis).    She ultimately does not desire invasive genetic testing.     Non-invasive Pregnancy Testing (NIPT)  I reviewed current non-invasive screening options. Currently non-invasive pregnancy testing (NIPT) offers the highest detection rate (with the lowest false positive rate) for the detection of fetal aneuploidy amongst high-risk patients. The limitations of detailed mid-trimester sonography was reviewed with the patient. First trimester screening and second trimester multiple-marker serum serum screening as alternative aneuploidy screening options were also reviewed. However, both of these tests are associated with lower detection and higher false positive rates.    Trial of labor after  -   The risks of vaginal  trial of labor were reviewed including a 0.7% chance of uterine rupture that increases to 1.4-2% with pitocin induction or augmentation. She was counseled on the outcomes of uterine rupture including bleeding, fetal distress, need for emergent , and possible maternal and  morbidity. She was counseled on the alternative option of a repeat  delivery which comes with risks of surgery including bleeding, infection, damage to other structures, and longer healing time. Her questions were answered.     Family History -  Your son was born with persistent fetal circulation to the lens of 1 eye.  This was diagnosed when he was close to a year of age.  He was not making eye contact and they were concerned he might have autism.  It took several referrals from different pediatric specialists before the diagnosis was found.  He has had surgery to have that fetal vessel removed and the lens removed.  He needs a contact lens in that eye to see out of the affected eye.  They know that this is not a hereditary or genetic condition.    We discussed the recommended plan of care based on her  risk factors.  Nedra and her significant other, Marc, had their questions answered to their satisfaction.      IMPRESSION:  IUP at 12w0d  Crown-rump length consistent with a, normal NT ultrasound  Advanced maternal age, cell free DNA screen drawn today  History of  for breech presentation    RECOMMENDATIONS:  Continue care with Dr. Kwon  Follow-up growth & BPP ultrasound at 32 weeks.  Weekly NST's at 36 weeks.  Level 2 ultrasound at 20 weeks  Await results of the Prequel screen that was drawn last week by the OB office    Total time spent 55 minutes this calendar day which includes preparing to see the patient including chart review, obtaining and/or reviewing additional medical history, performing a physical exam and evaluation, documenting clinical information in the electronic medical record,  independently interpreting results, counseling the patient, communicating results to the patient/family/caregiver and coordinating care.     Case discussed with patient who demonstrated understanding and agreement with plan.     Thank you for allowing me to participate in the care of this patient.  Please feel free to contact me with any questions.    Ree Holder MD  Maternal-Fetal Medicine       Note to patient and family:  The 21st Century Cures Act makes medical notes available to patients in the interest of transparency.  However, please be advised that this is a medical document.  It is intended as a peer to peer communication.  It is written in medical language and may contain abbreviations or verbiage that are technical and unfamiliar.  It may appear blunt or direct.  Medical documents are intended to carry relevant information, facts as evident, and the clinical opinion of the practitioner.

## 2024-09-25 ENCOUNTER — INITIAL PRENATAL (OUTPATIENT)
Dept: OBGYN CLINIC | Facility: CLINIC | Age: 39
End: 2024-09-25
Payer: COMMERCIAL

## 2024-09-25 ENCOUNTER — HOSPITAL ENCOUNTER (EMERGENCY)
Facility: HOSPITAL | Age: 39
Discharge: HOME | End: 2024-09-25
Attending: EMERGENCY MEDICINE
Payer: COMMERCIAL

## 2024-09-25 ENCOUNTER — TELEPHONE (OUTPATIENT)
Dept: OBGYN CLINIC | Facility: CLINIC | Age: 39
End: 2024-09-25

## 2024-09-25 VITALS
OXYGEN SATURATION: 100 % | RESPIRATION RATE: 17 BRPM | DIASTOLIC BLOOD PRESSURE: 66 MMHG | WEIGHT: 157 LBS | HEIGHT: 66 IN | TEMPERATURE: 97 F | BODY MASS INDEX: 25.23 KG/M2 | SYSTOLIC BLOOD PRESSURE: 114 MMHG | HEART RATE: 84 BPM

## 2024-09-25 VITALS
SYSTOLIC BLOOD PRESSURE: 114 MMHG | HEIGHT: 66.14 IN | WEIGHT: 157.81 LBS | BODY MASS INDEX: 25.36 KG/M2 | DIASTOLIC BLOOD PRESSURE: 68 MMHG

## 2024-09-25 DIAGNOSIS — R33.9 URINARY RETENTION: Primary | ICD-10-CM

## 2024-09-25 DIAGNOSIS — O99.891 BACK PAIN IN PREGNANCY (HCC): Primary | ICD-10-CM

## 2024-09-25 DIAGNOSIS — M54.9 BACK PAIN IN PREGNANCY (HCC): Primary | ICD-10-CM

## 2024-09-25 DIAGNOSIS — R39.11 URINARY HESITANCY: ICD-10-CM

## 2024-09-25 DIAGNOSIS — R39.15 URINARY URGENCY: ICD-10-CM

## 2024-09-25 LAB
BILIRUB UR QL: NEGATIVE
CLARITY UR: CLEAR
GLUCOSE UR-MCNC: NORMAL MG/DL
HGB UR QL STRIP.AUTO: NEGATIVE
KETONES UR-MCNC: NEGATIVE MG/DL
LEUKOCYTE ESTERASE UR QL STRIP.AUTO: NEGATIVE
NITRITE UR QL STRIP.AUTO: NEGATIVE
PH UR: 5.5 [PH] (ref 5–8)
PROT UR-MCNC: NEGATIVE MG/DL
SP GR UR STRIP: 1.01 (ref 1–1.03)
UROBILINOGEN UR STRIP-ACNC: NORMAL

## 2024-09-25 PROCEDURE — 99213 OFFICE O/P EST LOW 20 MIN: CPT | Performed by: OBSTETRICS & GYNECOLOGY

## 2024-09-25 PROCEDURE — 51798 US URINE CAPACITY MEASURE: CPT

## 2024-09-25 PROCEDURE — 81003 URINALYSIS AUTO W/O SCOPE: CPT | Performed by: EMERGENCY MEDICINE

## 2024-09-25 PROCEDURE — 99283 EMERGENCY DEPT VISIT LOW MDM: CPT

## 2024-09-25 PROCEDURE — 99284 EMERGENCY DEPT VISIT MOD MDM: CPT

## 2024-09-25 PROCEDURE — 87086 URINE CULTURE/COLONY COUNT: CPT | Performed by: OBSTETRICS & GYNECOLOGY

## 2024-09-25 NOTE — TELEPHONE ENCOUNTER
Called pt having difficulty with urination.  Per pt 2 days ago difficulty voiding and today took more than 20 mins to relieve bladder.  Scheduled with Dr. Sharma today at [place].  Informed Dr. Sharma.  Pt agrees,

## 2024-09-25 NOTE — ED INITIAL ASSESSMENT (HPI)
States OBGYN advised to come to ER for urinary retention. States is able to urinate but not normally. Has to press hard and strain for some urrinations. Pt is 13 wks gestation .

## 2024-09-25 NOTE — DISCHARGE INSTRUCTIONS
Please urinate every 2-3 hours on schedule regardless of urgency.  Follow-up with urogynecology as soon as possible.

## 2024-09-25 NOTE — TELEPHONE ENCOUNTER
The patient called and stated that she is having problems urinating. She has a full bladder but can't urinate. The patient is 12 weeks pregnant.

## 2024-09-25 NOTE — PROGRESS NOTES
RETURN OB VISIT    Nedra Jacobo is a 39 year old  at 12w6d presenting with concern for urinary retention. On Saturday she voided and felt like she did not empty her bladder completely. She was subsequently able to void on her own. She was then able to void -Tuesday but noticed again today that when she woke up, despite feeling like she had a full bladder, she struggled to void. She looked her symptoms up online and found recommendations for position changes and was ultimately able to void while squatting over her bathtub. She reports urinary urgency, as if her bladder is continuously full and she is unable to empty it. Dysuria or hematuria. No vaginal bleeding, cramping.     She has had some hip pain and wonders if it could be related to this, though she attributes it to squatting while playing with her son.     On bimanual exam, uterus appears to be retroverted and deviated to the left.     A/P: 38yo  at 12w6d presenting with urinary retention.     -retroverted uterus, possible uterine incarceration causing urinary retention  -discussed risks of urinary retention including stretch injury, UTI and rupture  -advised patient that if next void does not feel completely normal and complete she should present to the ED for catheterization  -will rule out UTI   -hip pain likely MSK and related to position, patient accepts PT referral     Dena Sharma DO

## 2024-09-26 ENCOUNTER — TELEPHONE (OUTPATIENT)
Dept: OBGYN CLINIC | Facility: CLINIC | Age: 39
End: 2024-09-26

## 2024-09-26 ENCOUNTER — TELEPHONE (OUTPATIENT)
Dept: UROLOGY | Facility: HOSPITAL | Age: 39
End: 2024-09-26

## 2024-09-26 NOTE — TELEPHONE ENCOUNTER
RN spoke with pt and informed her that Dr. Cavazos's office will reach out re: self-cath teaching. Pt verbalized understanding and agreed with plan of care.

## 2024-09-26 NOTE — ED PROVIDER NOTES
Patient Seen in: [facility] Emergency Department    History     Chief Complaint   Patient presents with    Urinary Retention    Pregnancy Issues       HPI    The patient presents to the ED complaining of some difficulty initiating urination and emptying her bladder over the past several days.  She states when her bladder is full it is harder to urinate.  Has had success with squatting in the bathtub.  Currently 13 weeks pregnant, denies abdominal pain.  Denies dysuria.    History reviewed.   Past Medical History:    Asthma (HCC)    Lyme disease    As a teenager       History reviewed.   Past Surgical History:   Procedure Laterality Date      2022    Due to breach position         Medications :  (Not in a hospital admission)       Family History   Problem Relation Age of Onset    Hypertension Father     Pulmonary Disease Father         COPD    Renal Disease Father     Asthma Mother     Other (Nystagmus) Son     Other (Persistent fetal vasculature) Son     Depression Maternal Grandmother     Psoriasis Brother        Smoking Status:   Social History     Socioeconomic History    Marital status:    Tobacco Use    Smoking status: Never    Smokeless tobacco: Never   Vaping Use    Vaping status: Never Used   Substance and Sexual Activity    Alcohol use: Not Currently     Comment: Very rarely do i have some alcohol    Drug use: Never    Sexual activity: Yes     Partners: Male   Other Topics Concern    Caffeine Concern No    Special Diet No    Weight Concern No    Exercise Yes     Comment: Once or twice a week    Blood Transfusions No       Constitutional and vital signs reviewed.      Social History and Family History elements reviewed from today, pertinent positives to the presenting problem noted.    Physical Exam     ED Triage Vitals [24 1524]   /77   Pulse 87   Resp 20   Temp 97.1 °F (36.2 °C)   Temp src Temporal   SpO2 97 %   O2 Device None (Room air)       All measures to  prevent infection transmission during my interaction with the patient were taken. Handwashing was performed prior to and after the exam.  Stethoscope and any equipment used during my examination was cleaned with super sani-cloth germicidal wipes following the exam.     Physical Exam  Vitals and nursing note reviewed.   Constitutional:       General: She is not in acute distress.     Appearance: She is well-developed. She is not ill-appearing or toxic-appearing.   HENT:      Head: Normocephalic and atraumatic.   Eyes:      General:         Right eye: No discharge.         Left eye: No discharge.      Conjunctiva/sclera: Conjunctivae normal.   Neck:      Trachea: No tracheal deviation.   Cardiovascular:      Rate and Rhythm: Normal rate.   Pulmonary:      Effort: Pulmonary effort is normal. No respiratory distress.      Breath sounds: No stridor.   Abdominal:      General: There is no distension.      Palpations: Abdomen is soft.      Tenderness: There is no abdominal tenderness.   Musculoskeletal:         General: No deformity.   Skin:     General: Skin is warm and dry.   Neurological:      Mental Status: She is alert and oriented to person, place, and time.   Psychiatric:         Mood and Affect: Mood normal.         Behavior: Behavior normal.         ED Course        Labs Reviewed   URINALYSIS WITH CULTURE REFLEX       As Interpreted by me    Imaging Results Available and Reviewed while in ED: No results found.  ED Medications Administered: Medications - No data to display      MDM     Vitals:    09/25/24 1524 09/25/24 1600 09/25/24 1645   BP: 117/77 103/61 114/66   Pulse: 87 72 84   Resp: 20 16 17   Temp: 97.1 °F (36.2 °C)     TempSrc: Temporal     SpO2: 97% 98% 100%   Weight: 71.2 kg     Height: 167.6 cm (5' 6\")       *I personally reviewed and interpreted all ED vitals.    Pulse Ox: 100%, Room air, Normal     Differential Diagnosis/ Diagnostic Considerations: Urinary retention, UTI, other    Complicating  Factors: The patient already has does not have any pertinent problems on file. to contribute to the complexity of this ED evaluation.    Medical Decision Making  The patient presents to the ED with subjective urinary retention symptoms over the past 2 days.  Nondistressed on examination.  Urinalysis without flexion.  Postvoid residual 200.  I discussed with Dr. Molina for OB/GYN consultation who recommends discharge but close follow-up with urogynecology.    Problems Addressed:  Urinary retention: acute illness or injury    Amount and/or Complexity of Data Reviewed  Labs: ordered. Decision-making details documented in ED Course.  Discussion of management or test interpretation with external provider(s):  I discussed with Dr. Molina for OB/GYN consultation        Condition upon leaving the department: Stable    Disposition and Plan     Clinical Impression:  1. Urinary retention        Disposition:  Discharge    Follow-up:  Gabriela Cavazos, DO  [address] [address]  [phone]    Call in 1 day(s)        Medications Prescribed:  Discharge Medication List as of 9/25/2024  4:43 PM

## 2024-09-26 NOTE — TELEPHONE ENCOUNTER
RN spoke with Dr. Cavazos's office. One of the providers will have to reach out personally to get the pt an earlier appt.

## 2024-09-26 NOTE — TELEPHONE ENCOUNTER
The patient called and stated that she was directed to go to the emergency room by Dr. Ellis. The ER checked and every thing was okay. They instructed her to go to see Dr. Cavazos the Uro gynecologist. She called that office and could not get an appointment until 10/30/24. She was then instructed to call our office to have them reach out to Dr. Cavazos's office to get an appointment next week. The number is [phone]

## 2024-09-26 NOTE — TELEPHONE ENCOUNTER
Outgoing telephone call to patient.   Scheduled void assessment with CISC teaching with RN on 09/27/2024 at 12:00 pm.  Informed patient will teach CISC and follow the volumes for 24 hours.  A plan of care will be discuss upon receiving volumes.   Patient verbalized understanding.   Encouraged to call questions or concerns.

## 2024-09-27 ENCOUNTER — NURSE ONLY (OUTPATIENT)
Dept: UROLOGY | Facility: HOSPITAL | Age: 39
End: 2024-09-27
Attending: OBSTETRICS & GYNECOLOGY
Payer: COMMERCIAL

## 2024-09-27 VITALS — RESPIRATION RATE: 18 BRPM | HEIGHT: 66 IN | WEIGHT: 157 LBS | BODY MASS INDEX: 25.23 KG/M2

## 2024-09-27 DIAGNOSIS — R33.9 INCOMPLETE BLADDER EMPTYING: Primary | ICD-10-CM

## 2024-09-27 PROCEDURE — 51701 INSERT BLADDER CATHETER: CPT

## 2024-09-27 RX ORDER — MULTIVITAMIN WITH IRON
250 TABLET ORAL
COMMUNITY

## 2024-09-27 NOTE — PROGRESS NOTES
Patient is thirteen weeks pregnant who was recently seen in the ER for difficulty initiating urination and emptying her bladder.  S:  Denies s/sx of UTI. Bowel's constipated. Bowel regimen reviewed. Handouts provided.   O:   VS:  Gen: NAD  Pulm: nl effort  : No active bleeding.  Discharge none  Patient demonstrated CISC procedure with RN.   PVR 50 mLs clear yellow, non odorous urine.   Patient  tolerated procedure well.    A:   Dx: Incomplete bladder emptying  P:  CISC for 24 hours and record volumes.   Reviewed sx of UTI.  Reviewed bowel regimen  Provided voiding diary with instructions  Will call on Monday (09/30/2024) for volumes.  Catheters provided.   Office will need to order catheters if patient needs to continue with CISC.    All questions answered  She understands and agrees to plan

## 2024-09-30 ENCOUNTER — TELEPHONE (OUTPATIENT)
Dept: UROLOGY | Facility: HOSPITAL | Age: 39
End: 2024-09-30

## 2024-09-30 NOTE — TELEPHONE ENCOUNTER
Telephone call to patient for CISC update   The patient was too CISC with voids during a 24 hour period.   Able to self-catheterize without difficulty   Volumes as follows:      Voided volume PVR   DATE 09/29/24     0815 am 140 cc 5 cc   0955 am 200 cc 5 cc   1245 pm 250 cc 5 cc   240 pm 320 cc 20 cc   545 pm 300 cc 10 cc scant amount of blood noted with insertion of catheter   9 pm  280 cc 25  scant amount of blood noted with insertion of catheter   945 pm  125 cc Did not cath   1115 pm 300 cc 35 cc   DATE  09/30/24     0300 am 130 cc 375 cc scant amount of blood noted with insertion of catheter. Patient reported being constipated.   0650 am 300 cc 20 cc     Denies current s/sx of UTI   Bowels constipated. Bowel regimen reviewed.   Denies pain   Informed to CISC as needed.   Encouraged to keep bowels moving soft and easy daily.   Will discuss above with provider further recommendations, and  follow up appointment.  Will need to order catheter supplies   Encouraged to call with signs and symptoms of UTI   All questions answered   Patient understands and agrees to plan     Addendum:  Outgoing telephone call to patient.   Informed patient of the provider recommendations which are as follows:  CISC prn, johnathon with full bladder sensation after void   To keep scheduled appointments with OB (11/01/2024 & MFM 11/14/24)  Patient states she has a lot of catheters left over.   Did not order catheters via [employer] per patient request.   The patient will call office if running low on CISC catheters.   Encouraged to call office with UTI complaints. .   Patient verbalized understanding.  Encouraged to call with questions or concerns.

## 2024-10-08 ENCOUNTER — TELEPHONE (OUTPATIENT)
Dept: UROLOGY | Facility: CLINIC | Age: 39
End: 2024-10-08

## 2024-10-08 NOTE — TELEPHONE ENCOUNTER
TC to patient for update on voiding function  Has not been catheterizing since 9/29  Denies any current s/sx of UTI   Bowels regular   Following with OB, has every 4 week appts   Encouraged to call with future voiding dysfunction or any s/sx of UTI  Patient verbalized understanding and had no further questions

## 2024-11-04 ENCOUNTER — ROUTINE PRENATAL (OUTPATIENT)
Dept: OBGYN CLINIC | Facility: CLINIC | Age: 39
End: 2024-11-04
Payer: COMMERCIAL

## 2024-11-04 ENCOUNTER — LAB ENCOUNTER (OUTPATIENT)
Dept: LAB | Age: 39
End: 2024-11-04
Attending: OBSTETRICS & GYNECOLOGY
Payer: COMMERCIAL

## 2024-11-04 VITALS
WEIGHT: 159.81 LBS | SYSTOLIC BLOOD PRESSURE: 126 MMHG | HEIGHT: 66 IN | DIASTOLIC BLOOD PRESSURE: 74 MMHG | BODY MASS INDEX: 25.68 KG/M2

## 2024-11-04 DIAGNOSIS — O09.529 ANTEPARTUM MULTIGRAVIDA OF ADVANCED MATERNAL AGE (HCC): Primary | ICD-10-CM

## 2024-11-04 DIAGNOSIS — O09.529 ANTEPARTUM MULTIGRAVIDA OF ADVANCED MATERNAL AGE (HCC): ICD-10-CM

## 2024-11-04 PROCEDURE — 36415 COLL VENOUS BLD VENIPUNCTURE: CPT

## 2024-11-04 PROCEDURE — 82105 ALPHA-FETOPROTEIN SERUM: CPT

## 2024-11-04 NOTE — PROGRESS NOTES
Had recent cold. Some movement now.  Denies pain, bleeding. LOF  39 year old  at 18w4d      Return OB  Pre-Alexy Care: UTD. AFP today.   Level 2 ultrasound scheduled    at future visits for TOLAC.  Patient Active Problem List   Diagnosis    Acute left-sided thoracic back pain    Previous  section       - Return to clinic in: 4

## 2024-11-05 ENCOUNTER — PATIENT MESSAGE (OUTPATIENT)
Dept: OBGYN CLINIC | Facility: CLINIC | Age: 39
End: 2024-11-05

## 2024-11-07 LAB
AFP MOM: 1.17
AFP VALUE: 52.2 NG/ML
GA ON COLL DATE: 18.6 WEEKS
INSULIN DEP AFP: NO
MAT AGE AT EDD: 39.8 YR
MULTIPLE GEST AFP: NO
OSBR RISK 1 IN AFP: 7137
WEIGHT AFP: 159 LBS

## 2024-11-11 ENCOUNTER — TELEPHONE (OUTPATIENT)
Dept: OBGYN CLINIC | Facility: CLINIC | Age: 39
End: 2024-11-11

## 2024-11-11 NOTE — TELEPHONE ENCOUNTER
Medical Questionnaire received and logged for processing. Nor Release of Information on file - patient sent [employer] message.

## 2024-11-13 NOTE — PROGRESS NOTES
Outpatient Maternal-Fetal Medicine Follow-Up     Dear Dr. Kwon,     Thank you for requesting ultrasound evaluation and maternal fetal medicine consultation on your patient Nedra Jacobo.  As you are aware she is a 39 year old female  with a [place] pregnancy and an Estimated Date of Delivery: 4/3/25.  She returned to maternal-fetal medicine today for a follow-up visit.  Her history was reviewed from her prior visit and there were no interval changes.     Antepartum Risk Factors  AMA, low risk cfDNA screen  H/o   Son with persistent fetal circulation to the lens    S: No complaints or concerns     We reviewed her low risk cell free DNA as well as her negative maternal serum AFP.  We discussed spina bifida and that both the ultrasound and the AFP test are reassuring against spina bifida.    She has questions about the placenta location and if that would impact her ability to have a .  She has an anterior placenta that is not low-lying.  She is a good candidate for  since her son  was only performed because she was a persistent breech presentation.    PHYSICAL EXAMINATION:  /71   Pulse 92   Wt 160 lb (72.6 kg)   LMP 2024 (Exact Date)   BMI 25.82 kg/m²   General: alert and oriented, no acute distress  Abdomen: gravid, soft, non-tender  Extremities: non-tender, no edema     OBSTETRIC ULTRASOUND  The patient had a Level 2 ultrasound today which I interpreted the results and reviewed them with the patient.    Ultrasound Findings:  Single IUP in cephalic presentation.    Placenta is anterior.   A 3 vessel cord is noted.  Cardiac activity is present at 145 bpm   g ( 0 lb 11 oz);   MVP is 4 cm .     The fetal measurements are consistent with the established EDC. No ultrasound evidence of structural abnormalities are seen today. The nasal bone is present. No ultrasound evidence of markers for aneuploidy are seen. She understands that ultrasound exam cannot exclude  genetic abnormalities and that genetic testing is recommended. The limitations of ultrasound were discussed.     Uterus and adnexa appeared normal today on US    See imaging tab for the complete US report.     DISCUSSION  During her visit we discussed and reviewed the following issues:  ADVANCED MATERNAL AGE - See M note from 24 for detailed review.  I reviewed with the patient that pregnancies in women of advanced maternal age (35 or older at delivery) are associated with elevated risks. Specifically, there is a higher rate of:  Fetal malformations  Preeclampsia  Gestational diabetes  Intrauterine fetal death     As a result, enhanced pregnancy surveillance is advised for these patients including a comprehensive ultrasound to assess for fetal malformations (at 20 weeks) and a third trimester ultrasound assessment for fetal growth (at 32 weeks). In addition, weekly NST's (initiating at 36 weeks gestation for women 35-39 years and at 32 weeks gestation for women 40 years and older) are also advised. Routine obstetric care is more than adequate to assess for gestational diabetes and preeclampsia; hence, no further significant alterations in obstetric care are advised.       Fetal Aneuploidy  We discussed her low risk cell free DNA screen and her normal level II ultrasound today.  We discussed her option for amniocentesis but that the likelihood of finding a genetic concern is quite low after her other low risk evaluations.  She appropriately declined invasive prenatal genetic diagnostic testing.       Trial of labor after  -   See MFM note from 24 for detailed review.     Family History -  Your son was born with persistent fetal circulation to the lens of 1 eye.  This was diagnosed when he was close to a year of age.  He was not making eye contact and they were concerned he might have autism.  It took several referrals from different pediatric specialists before the diagnosis was found.  He has  had surgery to have that fetal vessel removed and the lens removed.  He needs a contact lens in that eye to see out of the affected eye.  They know that this is not a hereditary or genetic condition.     We discussed the recommended plan of care based on her  risk factors.  Nedra and her significant other, Marc, had their questions answered to their satisfaction.        IMPRESSION:  IUP at 20w0d  Normal level II Ultrasound  Advanced maternal age, low risk cell free DNA screen.  Invasive testing declined  History of  for breech presentation     RECOMMENDATIONS:  Continue care with Dr. Kwon  Follow-up growth & BPP ultrasound at 32 weeks.  Weekly NST's at 36 weeks.        Total time spent 40 minutes this calendar day which includes preparing to see the patient including chart review, obtaining and/or reviewing additional medical history, performing a physical exam and evaluation, documenting clinical information in the electronic medical record, independently interpreting results, counseling the patient, communicating results to the patient/family/caregiver and coordinating care.     Case discussed with patient who demonstrated understanding and agreement with plan.     Thank you for allowing me to participate in the care of this patient.  Please feel free to contact me with any questions.    Ree Holder MD  Maternal-Fetal Medicine       Note to patient and family:  The 21st Century Cures Act makes medical notes available to patients in the interest of transparency.  However, please be advised that this is a medical document.  It is intended as a peer to peer communication.  It is written in medical language and may contain abbreviations or verbiage that are technical and unfamiliar.  It may appear blunt or direct.  Medical documents are intended to carry relevant information, facts as evident, and the clinical opinion of the practitioner.

## 2024-11-14 ENCOUNTER — HOSPITAL ENCOUNTER (OUTPATIENT)
Dept: PERINATAL CARE | Facility: HOSPITAL | Age: 39
Discharge: HOME | End: 2024-11-14
Attending: OBSTETRICS & GYNECOLOGY
Payer: COMMERCIAL

## 2024-11-14 VITALS
WEIGHT: 160 LBS | DIASTOLIC BLOOD PRESSURE: 71 MMHG | HEART RATE: 92 BPM | BODY MASS INDEX: 26 KG/M2 | SYSTOLIC BLOOD PRESSURE: 121 MMHG

## 2024-11-14 DIAGNOSIS — Z36.89 ENCOUNTER FOR FETAL ANATOMIC SURVEY (HCC): ICD-10-CM

## 2024-11-14 DIAGNOSIS — O09.522 AMA (ADVANCED MATERNAL AGE) MULTIGRAVIDA 35+, SECOND TRIMESTER (HCC): Primary | ICD-10-CM

## 2024-11-14 DIAGNOSIS — Z98.891 PREVIOUS CESAREAN SECTION: ICD-10-CM

## 2024-11-14 DIAGNOSIS — O09.522 AMA (ADVANCED MATERNAL AGE) MULTIGRAVIDA 35+, SECOND TRIMESTER (HCC): ICD-10-CM

## 2024-11-14 PROCEDURE — 76811 OB US DETAILED SNGL FETUS: CPT | Performed by: OBSTETRICS & GYNECOLOGY

## 2024-11-18 NOTE — TELEPHONE ENCOUNTER
Dr. Kwon,    Please sign off on form if you agree to: medical questionnaire    -Signature page will be the first page scanned  -From your Inbasket, Highlight the patient and click Chart   -Double click the 11/11/24 Forms Completion telephone encounter  -Scroll down to the Media section   -Click the blue Hyperlink: medical questionnaire  11/18/24  -Click Acknowledge located in the top right ribbon/menu   -Drag the mouse into the blank space of the document and a + sign will appear. Left click to   electronically sign the document.  -Once signed, simply exit out of the screen and you signature will be saved.     Thank you,     Lynsey

## 2024-12-02 ENCOUNTER — ROUTINE PRENATAL (OUTPATIENT)
Dept: OBGYN CLINIC | Facility: CLINIC | Age: 39
End: 2024-12-02
Payer: COMMERCIAL

## 2024-12-02 VITALS
WEIGHT: 162 LBS | SYSTOLIC BLOOD PRESSURE: 110 MMHG | BODY MASS INDEX: 26.03 KG/M2 | HEIGHT: 66 IN | DIASTOLIC BLOOD PRESSURE: 68 MMHG

## 2024-12-02 DIAGNOSIS — O09.529 ANTEPARTUM MULTIGRAVIDA OF ADVANCED MATERNAL AGE (HCC): Primary | ICD-10-CM

## 2024-12-02 PROBLEM — O09.522 MULTIGRAVIDA OF ADVANCED MATERNAL AGE IN SECOND TRIMESTER (HCC): Status: ACTIVE | Noted: 2024-12-02

## 2024-12-02 PROBLEM — O09.522 MULTIGRAVIDA OF ADVANCED MATERNAL AGE IN SECOND TRIMESTER (HCC): Chronic | Status: ACTIVE | Noted: 2024-12-02

## 2024-12-02 PROBLEM — O09.523 MULTIGRAVIDA OF ADVANCED MATERNAL AGE IN THIRD TRIMESTER (HCC): Chronic | Status: ACTIVE | Noted: 2024-12-02

## 2024-12-02 PROBLEM — O09.523 MULTIGRAVIDA OF ADVANCED MATERNAL AGE IN THIRD TRIMESTER (HCC): Status: ACTIVE | Noted: 2024-12-02

## 2024-12-02 PROBLEM — Z98.891 PREVIOUS CESAREAN SECTION: Chronic | Status: ACTIVE | Noted: 2024-08-19

## 2024-12-02 PROCEDURE — 87086 URINE CULTURE/COLONY COUNT: CPT | Performed by: OBSTETRICS & GYNECOLOGY

## 2024-12-02 NOTE — PROGRESS NOTES
40 y/o  at 22 W  AMA  Previous c/s for breech. Patient interested in   MFM recommendations:\  Follow-up growth & BPP ultrasound at 32 weeks.  Weekly NST's at 36 weeks.

## 2024-12-13 ENCOUNTER — TELEPHONE (OUTPATIENT)
Dept: OBGYN CLINIC | Facility: CLINIC | Age: 39
End: 2024-12-13

## 2024-12-13 NOTE — TELEPHONE ENCOUNTER
RN spoke with pt and told her that Dr. Kwon is not concerned. RN recommended that pt increase her fluid intake and call the office if she has UTI symptoms. Pt verbalized understanding and agreed with plan of care.

## 2024-12-13 NOTE — TELEPHONE ENCOUNTER
RN spoke with pt. Pt had UC on 12/2 that revealed probable contamination. RN explained the meaning. Pt is concerned because her urine has been cloudy x1-2 mos. Pt denies other UTI symptoms. Pt admits to not drinking as much water as she should. RN told pt to increase water. RN told pt RN would speak to Dr. Kwon and would call her back. Pt verbalized understanding and agreed with plan of care.

## 2024-12-13 NOTE — TELEPHONE ENCOUNTER
Patient is calling requesting to speak to RN regarding test results from 12/2/2024 per patient is still concern her urine is still cloudy. Please follow up with patient

## 2025-01-02 ENCOUNTER — LAB ENCOUNTER (OUTPATIENT)
Dept: LAB | Facility: REFERENCE LAB | Age: 40
End: 2025-01-02
Attending: OBSTETRICS & GYNECOLOGY
Payer: COMMERCIAL

## 2025-01-02 ENCOUNTER — ROUTINE PRENATAL (OUTPATIENT)
Dept: OBGYN CLINIC | Facility: CLINIC | Age: 40
End: 2025-01-02
Payer: COMMERCIAL

## 2025-01-02 VITALS
WEIGHT: 165 LBS | BODY MASS INDEX: 26.52 KG/M2 | DIASTOLIC BLOOD PRESSURE: 68 MMHG | HEIGHT: 66 IN | SYSTOLIC BLOOD PRESSURE: 92 MMHG

## 2025-01-02 DIAGNOSIS — R10.2 PELVIC PAIN AFFECTING PREGNANCY IN THIRD TRIMESTER, ANTEPARTUM (HCC): ICD-10-CM

## 2025-01-02 DIAGNOSIS — O34.219 PREVIOUS CESAREAN DELIVERY AFFECTING PREGNANCY (HCC): ICD-10-CM

## 2025-01-02 DIAGNOSIS — O09.529 ANTEPARTUM MULTIGRAVIDA OF ADVANCED MATERNAL AGE (HCC): ICD-10-CM

## 2025-01-02 DIAGNOSIS — Z34.80 SUPERVISION OF OTHER NORMAL PREGNANCY, ANTEPARTUM (HCC): Primary | ICD-10-CM

## 2025-01-02 DIAGNOSIS — Z34.80 SUPERVISION OF OTHER NORMAL PREGNANCY, ANTEPARTUM (HCC): ICD-10-CM

## 2025-01-02 DIAGNOSIS — O26.893 PELVIC PAIN AFFECTING PREGNANCY IN THIRD TRIMESTER, ANTEPARTUM (HCC): ICD-10-CM

## 2025-01-02 PROBLEM — M54.6 ACUTE LEFT-SIDED THORACIC BACK PAIN: Status: RESOLVED | Noted: 2023-12-07 | Resolved: 2025-01-02

## 2025-01-02 LAB
DEPRECATED RDW RBC AUTO: 45.4 FL (ref 35.1–46.3)
ERYTHROCYTE [DISTWIDTH] IN BLOOD BY AUTOMATED COUNT: 13.2 % (ref 11–15)
GLUCOSE 1H P GLC SERPL-MCNC: 97 MG/DL
HCT VFR BLD AUTO: 34.5 %
HGB BLD-MCNC: 11.6 G/DL
MCH RBC QN AUTO: 32 PG (ref 26–34)
MCHC RBC AUTO-ENTMCNC: 33.6 G/DL (ref 31–37)
MCV RBC AUTO: 95 FL
PLATELET # BLD AUTO: 194 10(3)UL (ref 150–450)
PLATELETS.RETICULATED NFR BLD AUTO: 18.7 % (ref 0–7)
RBC # BLD AUTO: 3.63 X10(6)UL
WBC # BLD AUTO: 10.8 X10(3) UL (ref 4–11)

## 2025-01-02 PROCEDURE — 36415 COLL VENOUS BLD VENIPUNCTURE: CPT

## 2025-01-02 PROCEDURE — 82950 GLUCOSE TEST: CPT

## 2025-01-02 PROCEDURE — 85027 COMPLETE CBC AUTOMATED: CPT

## 2025-01-02 NOTE — PROGRESS NOTES
ZULY, no complaints.    Denies pain, bleeding. LOF  Report regular fetal movement    39 year old  at 27w0d    Return OB  Pre- Care: UTD. Has EFW sched with MFM on .  - rhogam and tdap today  - 1 hr GTT and cbc today    Previous   - wants TOLAC 40+ wks (previous baby was born @ 39 weeks and feels the vision difficulties relate to him being a bit too early)    Patient Active Problem List   Diagnosis    Acute left-sided thoracic back pain    Previous  section    Antepartum multigravida of advanced maternal age (HCC)    Supervision of other normal pregnancy, antepartum ([facility])       - Return to clinic in: 2 weeks    Yani Kwon DO

## 2025-01-03 PROCEDURE — 96372 THER/PROPH/DIAG INJ SC/IM: CPT | Performed by: OBSTETRICS & GYNECOLOGY

## 2025-01-03 PROCEDURE — 90715 TDAP VACCINE 7 YRS/> IM: CPT | Performed by: OBSTETRICS & GYNECOLOGY

## 2025-01-03 PROCEDURE — 90471 IMMUNIZATION ADMIN: CPT | Performed by: OBSTETRICS & GYNECOLOGY

## 2025-01-17 ENCOUNTER — ROUTINE PRENATAL (OUTPATIENT)
Dept: OBGYN CLINIC | Facility: CLINIC | Age: 40
End: 2025-01-17
Payer: COMMERCIAL

## 2025-01-17 VITALS
SYSTOLIC BLOOD PRESSURE: 112 MMHG | DIASTOLIC BLOOD PRESSURE: 72 MMHG | WEIGHT: 168.5 LBS | HEIGHT: 66 IN | BODY MASS INDEX: 27.08 KG/M2

## 2025-01-17 DIAGNOSIS — Z34.80 SUPERVISION OF OTHER NORMAL PREGNANCY, ANTEPARTUM (HCC): ICD-10-CM

## 2025-01-17 DIAGNOSIS — Z98.891 PREVIOUS CESAREAN SECTION: Primary | Chronic | ICD-10-CM

## 2025-01-17 NOTE — PROGRESS NOTES
RETURN OB VISIT    Nedra Jacobo is a 39 year old  at 29w1d presenting for return OB visit. Today she reports no contractions, vaginal bleeding or leaking fluid. Baby is moving well. Having some hip discomfort after workout classes, reports doing a lot of squats. Recommend adding stretching to her exercise regimen.     We discussed the risk of uterine rupture due to labor in the setting of uterine scar. This risk is estimated to be 1% in spontaneous labor and 2% if pitocin augmentation is used. We discussed the recommendation for continuous fetal monitoring during labor and a low threshold for  delivery for fetal intolerance or labor dystocia. We discussed the option for planned repeat  section. Discussed early epidural to establish anesthesia for possible  delivery. All questions answered. At this time patient wishes to proceed with TOLAC.    32wk US scheduled    Follow up in 2wk     Dena Sharma DO

## 2025-01-20 ENCOUNTER — HOSPITAL ENCOUNTER (OUTPATIENT)
Facility: HOSPITAL | Age: 40
Discharge: HOME | End: 2025-01-20
Attending: OBSTETRICS & GYNECOLOGY | Admitting: OBSTETRICS & GYNECOLOGY
Payer: COMMERCIAL

## 2025-01-20 ENCOUNTER — TELEPHONE (OUTPATIENT)
Dept: OBGYN CLINIC | Facility: CLINIC | Age: 40
End: 2025-01-20

## 2025-01-20 VITALS
HEART RATE: 72 BPM | SYSTOLIC BLOOD PRESSURE: 103 MMHG | TEMPERATURE: 98 F | RESPIRATION RATE: 16 BRPM | DIASTOLIC BLOOD PRESSURE: 60 MMHG

## 2025-01-20 PROCEDURE — 59025 FETAL NON-STRESS TEST: CPT

## 2025-01-20 PROCEDURE — 99214 OFFICE O/P EST MOD 30 MIN: CPT

## 2025-01-20 NOTE — TELEPHONE ENCOUNTER
Incoming call from patient to inform she has been experiencing abdominal pain since yesterday everytime she walks.  No discharge , only some wetness sensation .    Please assist .

## 2025-01-20 NOTE — TRIAGE
[facility]  part of [facility]      Triage Note    Nedra Jacobo Patient Status:  Outpatient    1985 MRN [id number]   Location [facility] Attending Yani Kwon DO   Hosp Day # 0 PCP Tosha Liu DO      Para:   Estimated Date of Delivery: 4/3/25  Gestation: 29w4d    Chief Complaint     Assessment; R/o Rom         Allergies:  Allergies[1]    Orders Placed This Encounter   Procedures    POCT Ferning       Lab Results   Component Value Date    WBC 10.8 2025    HGB 11.6 (L) 2025    HCT 34.5 (L) 2025    .0 2025    CREATSERUM 0.86 2023    BUN 15 2023     2023    K 4.1 2023     2023    CO2 27.0 2023     (H) 2023    CA 8.8 2023    ALB 3.7 2023    ALKPHO 63 2023    BILT 0.8 2023    TP 7.1 2023    AST 14 (L) 2023    ALT 26 2023    TSH 1.698 2023       Clinitek UA  Lab Results   Component Value Date    GLUUR Normal 2024    SPECGRAVITY 1.014 2024    URINECUL  2024     <10,000 cfu/ml Multiple species present- probable contamination.       UA  Lab Results   Component Value Date    COLORUR Light-Yellow 2024    CLARITY Clear 2024    SPECGRAVITY 1.014 2024    PROUR Negative 2024    GLUUR Normal 2024    KETUR Negative 2024    BILUR Negative 2024    BLOODURINE Negative 2024    NITRITE Negative 2024    UROBILINOGEN Normal 2024    LEUUR Negative 2024       Vitals:    25 1345   BP: 103/60   Pulse: 72   Resp: 16   Temp: 98 °F (36.7 °C)   TempSrc: Oral       NST  Variability: Moderate           Accelerations: Yes           Decelerations: None            Baseline: 135 BPM           Uterine Irritability: No           Contractions: Irregular                    Contraction Frequency: x2                   Acoustic Stimulator: No            Nonstress Test Interpretation: Appropriate for gestational age           Nonstress Test Second Interpretation: Appropriate for gestational age                        Additional Comments Comments: , 29 , nst appropraite for gestational age, pt to triage for rule out rom, amnioswab negative, ferning negative, uc x 2 on monitor, dr dewitt notified and patient discharged home. Discharge papers given and reviewed along with info sheet on round ligament pain, no futher questions upon discharge.     Chief Complaint   Patient presents with     Assessment     Feels like strained muscle, pain into pubic bone    R/o Rom     Leaking this am around 11, right after used washroom         Grace DOMINIQUE RN  2025 2:38 PM         [1]   Allergies  Allergen Reactions    Allegra [Fexofenadine] HIVES    Antihistamine & Nasal Deconges [Fexofenadine-Pseudoephedrine] HIVES    Levaquin MYALGIA     Joint pain    Bactrim [Sulfamethoxazole W/Trimethoprim] TINITUS

## 2025-01-20 NOTE — TELEPHONE ENCOUNTER
Called pt c/o lower abd across and in between legs, started around 4 pm after running (not a runner). Not felt with sitting but with walking.  Pt c/o wetness/moisture a little bit after urination.  Due to c/o advised to go to [facility] for further evaluation.    Called [facility] informed  Beatrice LANDAVERDE     Called Dr. Kwon and informed, agrees.

## 2025-01-20 NOTE — PROGRESS NOTES
Pt is a 39 year old female admitted to TR3/TR3-A.     Chief Complaint   Patient presents with     Assessment     Feels like strained muscle, pain into pubic bone    R/o Rom     Leaking this am around 11, right after used washroom      Pt is  29w4d intra-uterine pregnancy.  History obtained, consents signed. Oriented to room, staff, and plan of care.

## 2025-01-30 ENCOUNTER — ROUTINE PRENATAL (OUTPATIENT)
Dept: OBGYN CLINIC | Facility: CLINIC | Age: 40
End: 2025-01-30
Payer: COMMERCIAL

## 2025-01-30 VITALS
BODY MASS INDEX: 27.66 KG/M2 | WEIGHT: 172.13 LBS | DIASTOLIC BLOOD PRESSURE: 68 MMHG | SYSTOLIC BLOOD PRESSURE: 110 MMHG | HEIGHT: 66 IN

## 2025-01-30 DIAGNOSIS — R82.90 CLOUDY URINE: Primary | ICD-10-CM

## 2025-01-30 PROCEDURE — 87086 URINE CULTURE/COLONY COUNT: CPT | Performed by: OBSTETRICS & GYNECOLOGY

## 2025-01-30 NOTE — PROGRESS NOTES
Doing well. No OB complaints. +FM.   Continued RL pains. Recommend pregnancy support belt and hip stretching daily.     Patient denies history of genital HSV. Recommend against Valtrex suppression given no history of genital HSV.     Noted cloudy urine. Recommend checking urine culture today.     ZULY 2 weeks.     Dr. Craig Molina MD    [facility]     This note was created by nPicker voice recognition. Errors in content may be related to improper recognition by the system; efforts to review and correct have been done but errors may still exist. Please be advised the primary purpose of this note is for me to communicate medical care. Standard sentence structure is not always used. Medical terminology and medical abbreviations may be used. There may be grammatical, typographical, and automated fill ins that may have errors missed in proofreading.

## 2025-02-04 NOTE — PROGRESS NOTES
Outpatient Maternal-Fetal Medicine Follow-Up     Dear Dr. Kwon,     Thank you for requesting ultrasound evaluation and maternal fetal medicine consultation on your patient Nedra Jacobo.  As you are aware she is a 39 year old female  with a [place] pregnancy and an Estimated Date of Delivery: 4/3/25.  She returned to maternal-fetal medicine today for a follow-up visit.  Her history was reviewed from her prior visit and there were no interval changes.     Antepartum Risk Factors  AMA, low risk cfDNA screen  H/o   Son with persistent fetal circulation to the lens    S: No complaints or concerns     She passed her third trimester GDM screen.  PHYSICAL EXAMINATION:  /68   Wt 172 lb (78 kg)   LMP 2024 (Exact Date)   BMI 27.76 kg/m²   General: alert and oriented, no acute distress  Abdomen: gravid, soft, non-tender  Extremities: non-tender, no edema     OBSTETRIC ULTRASOUND  The patient had a follow-up fetal ultrasound today which I interpreted the results and reviewed them with the patient.    Ultrasound Findings:  Single IUP in cephalic presentation.    Placenta is anterior.   A 3 vessel cord is noted.  Cardiac activity is present at 135 bpm  EFW 1907 g ( 4 lb 3 oz); 49%.    JALEN is  16.8 cm.  MVP is 4.7 cm  BPP is 8/8.     +PACs seen intermittently during the exam.    The fetal measurements are consistent with established EDC. No gross ultrasound evidence of structural abnormalities are seen today. The patient understands that ultrasound cannot rule out all structural and chromosomal abnormalities.     See imaging tab for the complete US report.     DISCUSSION  During her visit we discussed and reviewed the following issues:  ADVANCED MATERNAL AGE - See MFM note from 24 for detailed review.  I reviewed with the patient that pregnancies in women of advanced maternal age (35 or older at delivery) are associated with elevated risks. Specifically, there is a higher rate of:  Fetal  malformations  Preeclampsia  Gestational diabetes  Intrauterine fetal death     As a result, enhanced pregnancy surveillance is advised for these patients including a comprehensive ultrasound to assess for fetal malformations (at 20 weeks) and a third trimester ultrasound assessment for fetal growth (at 32 weeks). In addition, weekly NST's (initiating at 36 weeks gestation for women 35-39 years and at 32 weeks gestation for women 40 years and older) are also advised. Routine obstetric care is more than adequate to assess for gestational diabetes and preeclampsia; hence, no further significant alterations in obstetric care are advised.     Trial of labor after  -   See [facility] note from 24 for detailed review.     Family History -  Your son was born with persistent fetal circulation to the lens of 1 eye.  This was diagnosed when he was close to a year of age.  He was not making eye contact and they were concerned he might have autism.  It took several referrals from different pediatric specialists before the diagnosis was found.  He has had surgery to have that fetal vessel removed and the lens removed.  He needs a contact lens in that eye to see out of the affected eye.  They know that this is not a hereditary or genetic condition.      Cardiac arrhythmias result from abnormal automaticity, abnormal conduction, or both. Fetal cardiac arrhythmias complicate 1 to 2 percent of pregnancies. They are categorized according to their rhythm (irregular, regular) and rate (tachycardia, bradycardia). The conduction system of the fetal heart is functionally mature by 16 weeks of gestation, and produces a regular rhythm and rate between 110 and 160 beats per minute (bpm) for the remainder of the pregnancy.  The most common cause of an irregular rhythm in the fetus is premature atrial contractions. In some cases, there is 1:1 conduction of ectopic beats so the ventricular contraction will also be early. On auscultation,  the rhythm will sound irregular, but the rate will be nearly normal.    In other cases, the premature beat will be blocked so there will be no associated ventricular contraction. In these cases, the ventricular rate will be slower than the atrial rate. When the premature beats are blocked intermittently, the rhythm sounds irregular and the heart rate rapidly varies from normal to slow.    Premature atrial contractions are usually benign and intermittent, and may resolve prior to delivery or shortly after birth. However, 1 to 3 percent of fetuses with premature atrial contractions will develop a tachyarrhythmia, which can lead to cardiovascular decompensation. Any fetus with a tachyarrhythmia should be evaluated promptly by fetal cardiology. Congenital heart disease is identified in only 0.3 to 2 percent of fetuses with premature atrial beats      A premature atrial contraction (PAC) was noted today. Isolated PAC in the absence of structural cardiac defect is usually a benign condition; however, approximately 0.5% of PAC can become sustained. Therefore, I would continue to assess fetal heart rate weekly for 2-3 minutes each time.  Please let us know if there is a persistent tachycardia (>170 bpm] or bradycardia (<110bpm) lasting longer than 10 minutes.  In addition, the mother should be instructed to call if she notes decreased fetal activity, as this may be a sign of fetal tachycardia and/or cardiovascular compromise.       We discussed the recommended plan of care based on her  risk factors.  Nedra and her significant other, Marc, had their questions answered to their satisfaction.        IMPRESSION:  IUP at 32w0d  Normal fetal growth and  testing  Occasional PACs noted  Advanced maternal age, low risk cell free DNA screen  History of  for breech presentation     RECOMMENDATIONS:  Continue care with Dr. Kwon  Weekly NST's at 36 weeks.  Weekly auscultation of the fetal heart rate  for 2 to 3 minutes.  Refer back to [facility] if there is tachycardia or bradycardia.  If there are 2 weeks in a row without any irregular beats seen, she can return to the routine prenatal care        Total time spent 30 minutes this calendar day which includes preparing to see the patient including chart review, obtaining and/or reviewing additional medical history, performing a physical exam and evaluation, documenting clinical information in the electronic medical record, independently interpreting results, counseling the patient, communicating results to the patient/family/caregiver and coordinating care.     Case discussed with patient who demonstrated understanding and agreement with plan.     Thank you for allowing me to participate in the care of this patient.  Please feel free to contact me with any questions.    Ree Holder MD  Maternal-Fetal Medicine       Note to patient and family:  The 21st Century Cures Act makes medical notes available to patients in the interest of transparency.  However, please be advised that this is a medical document.  It is intended as a peer to peer communication.  It is written in medical language and may contain abbreviations or verbiage that are technical and unfamiliar.  It may appear blunt or direct.  Medical documents are intended to carry relevant information, facts as evident, and the clinical opinion of the practitioner.

## 2025-02-06 ENCOUNTER — HOSPITAL ENCOUNTER (OUTPATIENT)
Dept: PERINATAL CARE | Facility: HOSPITAL | Age: 40
Discharge: HOME | End: 2025-02-06
Attending: OBSTETRICS & GYNECOLOGY
Payer: COMMERCIAL

## 2025-02-06 VITALS — DIASTOLIC BLOOD PRESSURE: 68 MMHG | BODY MASS INDEX: 28 KG/M2 | WEIGHT: 172 LBS | SYSTOLIC BLOOD PRESSURE: 109 MMHG

## 2025-02-06 DIAGNOSIS — O09.529 ANTEPARTUM MULTIGRAVIDA OF ADVANCED MATERNAL AGE (HCC): ICD-10-CM

## 2025-02-06 DIAGNOSIS — Z98.891 PREVIOUS CESAREAN SECTION: ICD-10-CM

## 2025-02-06 DIAGNOSIS — Z34.80 SUPERVISION OF OTHER NORMAL PREGNANCY, ANTEPARTUM (HCC): ICD-10-CM

## 2025-02-06 DIAGNOSIS — O09.529 ANTEPARTUM MULTIGRAVIDA OF ADVANCED MATERNAL AGE (HCC): Primary | ICD-10-CM

## 2025-02-06 DIAGNOSIS — O36.8390 FETAL ARRHYTHMIA AFFECTING PREGNANCY, ANTEPARTUM (HCC): ICD-10-CM

## 2025-02-06 PROCEDURE — 76819 FETAL BIOPHYS PROFIL W/O NST: CPT

## 2025-02-06 PROCEDURE — 76816 OB US FOLLOW-UP PER FETUS: CPT | Performed by: OBSTETRICS & GYNECOLOGY

## 2025-02-13 ENCOUNTER — ROUTINE PRENATAL (OUTPATIENT)
Dept: OBGYN CLINIC | Facility: CLINIC | Age: 40
End: 2025-02-13
Payer: COMMERCIAL

## 2025-02-13 VITALS
DIASTOLIC BLOOD PRESSURE: 64 MMHG | HEIGHT: 66 IN | SYSTOLIC BLOOD PRESSURE: 102 MMHG | BODY MASS INDEX: 27.32 KG/M2 | WEIGHT: 170 LBS

## 2025-02-13 DIAGNOSIS — Z98.891 PREVIOUS CESAREAN SECTION: ICD-10-CM

## 2025-02-13 DIAGNOSIS — Z34.80 SUPERVISION OF OTHER NORMAL PREGNANCY, ANTEPARTUM (HCC): Primary | ICD-10-CM

## 2025-02-13 NOTE — PROGRESS NOTES
ZULY, no complaints.    Denies pain, bleeding. LOF  Report regular fetal movement    FHT auscultation today x 2 minutes: 2 PAC's heard with normal rate    Got postpartum maternity leave: Will need a letter stating TAMIKO by mid-march.  Gets 4 months maternity leave, 8 weeks is short term disability.  Will probably want to extend the short term disability bc she struggled with RTW after her first pregnancy.    39 year old  at 33w0d    Return OB  Pre- Care: UTD.    PAC on US with MFM  Continue care with Dr. Kwon  Weekly NST's at 36 weeks.  Weekly auscultation of the fetal heart rate for 2 to 3 minutes.  Refer back to MFM if there is tachycardia or bradycardia.  If there are 2 weeks in a row without any irregular beats seen, she can return to the routine prenatal care    Previous   - wants TOLAC 40+ wks (previous baby was born @ 39 weeks and feels the vision difficulties relate to him being a bit too early)    Patient Active Problem List   Diagnosis    Previous  section    Antepartum multigravida of advanced maternal age (HCC)    Supervision of other normal pregnancy, antepartum (HCC)       - Return to clinic in: 1 weeks    Yani Kwon DO

## 2025-02-14 ENCOUNTER — TELEPHONE (OUTPATIENT)
Dept: OBGYN CLINIC | Facility: CLINIC | Age: 40
End: 2025-02-14

## 2025-02-14 ENCOUNTER — PATIENT MESSAGE (OUTPATIENT)
Dept: OBGYN CLINIC | Facility: CLINIC | Age: 40
End: 2025-02-14

## 2025-02-14 NOTE — TELEPHONE ENCOUNTER
Received [employer] Family [employer] Leave Act forms via [employer]. Valid Authorization Confirmed. Created e-mail and logged for processing.

## 2025-02-14 NOTE — TELEPHONE ENCOUNTER
Patient called on status of forms that she had dropped off at the dr office. Unable to locate forms patient will send via [employer] or email informed patient to send as PDF. Informed patient of our 10-15 business day turnaround patient verbally understood.

## 2025-02-17 NOTE — TELEPHONE ENCOUNTER
Incomplete forms received via email 1/30/25, moved to filing cabinet as message below states we received complete forms. Moved new forms to my inbox for completion

## 2025-02-18 NOTE — TELEPHONE ENCOUNTER
Dr. Kwon,    Please sign off on form if you agree to: Family Medical Leave Act maternity     -Signature page will be the first page scanned  -From your Inbasket, Highlight the patient and click Chart   -Double click the 2/14/25 Forms Completion telephone encounter  -Scroll down to the Media section   -Click the blue Hyperlink: Family Medical Leave Act  2/18/25  -Click Acknowledge located in the top right ribbon/menu   -Drag the mouse into the blank space of the document and a + sign will appear. Left click to   electronically sign the document.  -Once signed, simply exit out of the screen and you signature will be saved.     Thank you,  Lynsey

## 2025-02-19 NOTE — TELEPHONE ENCOUNTER
Forms completed and faxed to [facility] at [phone], E fax confirmation received. [employer] message sent to patient.

## 2025-02-20 ENCOUNTER — OFFICE VISIT (OUTPATIENT)
Dept: OBGYN CLINIC | Facility: CLINIC | Age: 40
End: 2025-02-20
Payer: COMMERCIAL

## 2025-02-20 VITALS
HEIGHT: 66 IN | SYSTOLIC BLOOD PRESSURE: 106 MMHG | WEIGHT: 172 LBS | BODY MASS INDEX: 27.64 KG/M2 | DIASTOLIC BLOOD PRESSURE: 62 MMHG

## 2025-02-20 DIAGNOSIS — O09.529 ANTEPARTUM MULTIGRAVIDA OF ADVANCED MATERNAL AGE (HCC): ICD-10-CM

## 2025-02-20 DIAGNOSIS — Z34.80 SUPERVISION OF OTHER NORMAL PREGNANCY, ANTEPARTUM (HCC): Primary | ICD-10-CM

## 2025-02-20 DIAGNOSIS — Z98.891 PREVIOUS CESAREAN SECTION: ICD-10-CM

## 2025-02-20 NOTE — PROGRESS NOTES
ZULY, no complaints.    Denies pain, bleeding. LOF  Report regular fetal movement    FHT auscultation today x 2 minutes: No PAC's    Got postpartum maternity leave: Will need a letter stating TAMIKO by mid-march.  Gets 4 months maternity leave, 8 weeks is short term disability.  Will probably want to extend the short term disability bc she struggled with RTW after her first pregnancy.    39 year old  at 34w0d    Return OB  Pre- Care: UTD.    PAC on US with MFM  Continue care with Dr. Kwon  Weekly NST's at 36 weeks.  Weekly auscultation of the fetal heart rate for 2 to 3 minutes.  Refer back to MFM if there is tachycardia or bradycardia.  If there are 2 weeks in a row without any irregular beats seen, she can return to the routine prenatal care    Previous   - wants TOLAC 40+ wks (previous baby was born @ 39 weeks and feels the vision difficulties relate to him being a bit too early)    Patient Active Problem List   Diagnosis    Previous  section    Antepartum multigravida of advanced maternal age (HCC)    Supervision of other normal pregnancy, antepartum ([facility])       - Return to clinic in: 1 weeks    Yani Kwon DO

## 2025-02-27 ENCOUNTER — ROUTINE PRENATAL (OUTPATIENT)
Dept: OBGYN CLINIC | Facility: CLINIC | Age: 40
End: 2025-02-27
Payer: COMMERCIAL

## 2025-02-27 VITALS
WEIGHT: 174.38 LBS | HEIGHT: 66 IN | BODY MASS INDEX: 28.03 KG/M2 | SYSTOLIC BLOOD PRESSURE: 124 MMHG | DIASTOLIC BLOOD PRESSURE: 62 MMHG

## 2025-02-27 DIAGNOSIS — O09.93 SUPERVISION OF HIGH RISK PREGNANCY IN THIRD TRIMESTER (HCC): Primary | ICD-10-CM

## 2025-02-27 DIAGNOSIS — O36.8390 ABNORMAL FETAL HEART RATE AFFECTING PREGNANCY (HCC): ICD-10-CM

## 2025-02-27 NOTE — PROGRESS NOTES
Doing well. No OB complaints. +FM.   FHR with audible arrhythmia.   Recommend follow up with MFM ASAP.   Recommend SVE with membrane sweeping leading up to TAMIKO.   Letter provided for TAMIKO.     ZULY 1 week with NST.     Dr. Craig Molina MD    [facility]     This note was created by Dragon voice recognition. Errors in content may be related to improper recognition by the system; efforts to review and correct have been done but errors may still exist. Please be advised the primary purpose of this note is for me to communicate medical care. Standard sentence structure is not always used. Medical terminology and medical abbreviations may be used. There may be grammatical, typographical, and automated fill ins that may have errors missed in proofreading.

## 2025-02-28 ENCOUNTER — HOSPITAL ENCOUNTER (OUTPATIENT)
Dept: PERINATAL CARE | Facility: HOSPITAL | Age: 40
Discharge: HOME | End: 2025-02-28
Attending: OBSTETRICS & GYNECOLOGY
Payer: COMMERCIAL

## 2025-02-28 VITALS
DIASTOLIC BLOOD PRESSURE: 61 MMHG | SYSTOLIC BLOOD PRESSURE: 101 MMHG | BODY MASS INDEX: 28 KG/M2 | HEART RATE: 75 BPM | WEIGHT: 174 LBS

## 2025-02-28 DIAGNOSIS — O36.8390 FETAL ARRHYTHMIA AFFECTING PREGNANCY, ANTEPARTUM (HCC): ICD-10-CM

## 2025-02-28 DIAGNOSIS — O36.8390 FETAL ARRHYTHMIA AFFECTING PREGNANCY, ANTEPARTUM (HCC): Primary | ICD-10-CM

## 2025-02-28 PROCEDURE — 76815 OB US LIMITED FETUS(S): CPT

## 2025-02-28 PROCEDURE — 76827 ECHO EXAM OF FETAL HEART: CPT | Performed by: OBSTETRICS & GYNECOLOGY

## 2025-02-28 PROCEDURE — 76828 ECHO EXAM OF FETAL HEART: CPT | Performed by: OBSTETRICS & GYNECOLOGY

## 2025-02-28 NOTE — PROGRESS NOTES
Outpatient Maternal-Fetal Medicine Follow-Up    Dear Dr. Molina    Thank you for requesting ultrasound evaluation and maternal fetal medicine consultation on your patient Nedra Jacobo.  As you are aware she is a 39 year old female  with a zhang pregnancy and an Estimated Date of Delivery: 4/3/25.  She returned to maternal-fetal medicine today for a follow-up visit.  Her history was reviewed from her prior visit and there were no interval changes.    Antepartum Risk Factors  Prior PAC's  Advanced maternal age, low risk cell free DNA screen  History of  for breech presentation    PHYSICAL EXAMINATION:  /61   Pulse 75   Wt 174 lb (78.9 kg)   LMP 2024 (Exact Date)   BMI 28.08 kg/m²   General: alert and oriented, no acute distress  Abdomen: gravid, soft, non-tender  Extremities: non-tender, no edema    OBSTETRIC ULTRASOUND    Ultrasound Findings:  Single IUP in cephalic presentation.    Placenta is anterior, high.   A 3 vessel cord is noted.  Cardiac activity is present at 131 bpm  MVP is 5.3 cm . JALEN 15.4 cm    No fetal PACs or arrhythmias noted during the scan.    See PACS/Imaging Tab For Complete Ultrasound Report  I interpreted the results and reviewed them with the patient.    DISCUSSION  During her visit we discussed and reviewed the following issues:  PREMATURE ATRIAL CONTRACTIONS (PAC's)  Clinical History  An irregular heart rate was noted in the office; hence, an ultrasound and MFM consultation was advised.  On today's ultrasound using real-time and pulse Doppler imaging, premature atrial contractions (PAC's) were not noted today.     Background  Cardiac arrhythmias result from abnormal automaticity, abnormal conduction, or both. Fetal cardiac arrhythmias complicate 1 to 2 percent of pregnancies. They are categorized according to their rhythm (irregular, regular) and rate (tachycardia, bradycardia). The conduction system of the fetal heart is functionally mature by 16  weeks of gestation, and produces a regular rhythm and rate between 110 and 160 beats per minute (bpm) for the remainder of the pregnancy.    Presnetaion  The most common cause of an irregular rhythm in the fetus is premature atrial contractions. In some cases, there is 1:1 conduction of ectopic beats so the ventricular contraction will also be early. On auscultation, the rhythm will sound irregular, but the rate will be nearly normal.  In other cases, the premature beat will be blocked so there will be no associated ventricular contraction. In these cases, the ventricular rate will be slower than the atrial rate. When the premature beats are blocked intermittently, the rhythm sounds irregular and the heart rate rapidly varies from normal to slow.    Clinical Course  Premature atrial contractions are usually benign and intermittent, and may resolve prior to delivery or shortly after birth. Rarely, fetuses with premature atrial contractions will develop a tachyarrhythmia, which can lead to cardiovascular decompensation. Any fetus with a tachyarrhythmia should be evaluated promptly by fetal cardiology. Congenital heart disease is identified in only 0.3 to 2 percent of fetuses with premature atrial beats    Management  Isolated PAC's in the absence of structural cardiac defect is usually a benign condition; however, approximately 0.5% of PAC can become sustained. Therefore, weekly fetal heart rate auscultation is advised.  Please let us know if there is a persistent tachycardia (>180 bpm] or bradycardia (<110 bpm) lasting longer than 10 minutes.  In addition, the mother should be instructed to call if she notes decreased fetal activity, as this may be a sign of fetal tachycardia and/or cardiovascular compromise.      IMPRESSION:  IUP at 35w1d  Prior PAC's - NO PACs noted on today's scan  Advanced maternal age, low risk cell free DNA screen  History of  for breech presentation     RECOMMENDATIONS:  Continue care  with Dr. Molina  Weekly NST's at 36 weeks.  Weekly auscultation of the fetal heart rate for 2 to 3 minutes.  Refer back to MFM if there is TACHYCARDIA OR BRADYCARDIA - -no need for referral to MFM if just PAC's noted.        Thank you for allowing me to participate in the care of your patient.  Please do not hesitate to contact me if additional questions or concerns arise.      Danny Dorado M.D.    30 minutes spent in review of records, patient consultation, documentation and coordination of care.  The relevant clinical matter(s) are summarized above.     Note to patient and family  The 21st Century Cures Act makes medical notes available to patients in the interest of transparency.  However, please be advised that this is a medical document.  It is intended as [id number] communication.  It is written and medical language may contain abbreviations or verbiage that are technical and unfamiliar.  It may appear blunt or direct.  Medical documents are intended to carry relevant information, facts as evident, and the clinical opinion of the practitioner.

## 2025-03-07 ENCOUNTER — LAB ENCOUNTER (OUTPATIENT)
Dept: LAB | Age: 40
End: 2025-03-07
Attending: OBSTETRICS & GYNECOLOGY
Payer: COMMERCIAL

## 2025-03-07 ENCOUNTER — ROUTINE PRENATAL (OUTPATIENT)
Dept: OBGYN CLINIC | Facility: CLINIC | Age: 40
End: 2025-03-07
Payer: COMMERCIAL

## 2025-03-07 VITALS
DIASTOLIC BLOOD PRESSURE: 66 MMHG | BODY MASS INDEX: 28.09 KG/M2 | HEIGHT: 66 IN | WEIGHT: 174.81 LBS | SYSTOLIC BLOOD PRESSURE: 118 MMHG

## 2025-03-07 DIAGNOSIS — O09.529 ANTEPARTUM MULTIGRAVIDA OF ADVANCED MATERNAL AGE (HCC): ICD-10-CM

## 2025-03-07 DIAGNOSIS — Z98.891 PREVIOUS CESAREAN SECTION: Primary | Chronic | ICD-10-CM

## 2025-03-07 DIAGNOSIS — O36.8390 ABNORMAL FETAL HEART RATE AFFECTING PREGNANCY (HCC): ICD-10-CM

## 2025-03-07 DIAGNOSIS — Z98.891 PREVIOUS CESAREAN SECTION: ICD-10-CM

## 2025-03-07 LAB — T PALLIDUM AB SER QL IA: NONREACTIVE

## 2025-03-07 PROCEDURE — 87081 CULTURE SCREEN ONLY: CPT | Performed by: OBSTETRICS & GYNECOLOGY

## 2025-03-07 PROCEDURE — 36415 COLL VENOUS BLD VENIPUNCTURE: CPT

## 2025-03-07 PROCEDURE — 87389 HIV-1 AG W/HIV-1&-2 AB AG IA: CPT

## 2025-03-07 PROCEDURE — 59025 FETAL NON-STRESS TEST: CPT | Performed by: OBSTETRICS & GYNECOLOGY

## 2025-03-07 PROCEDURE — 87150 DNA/RNA AMPLIFIED PROBE: CPT | Performed by: OBSTETRICS & GYNECOLOGY

## 2025-03-07 PROCEDURE — 86780 TREPONEMA PALLIDUM: CPT

## 2025-03-07 NOTE — PROGRESS NOTES
ZULY, no complaints.    Denies pain, bleeding. LOF  Report regular fetal movement    NST - reactive       39 year old  at 36w1d    Return OB  Pre- Care: UTD. GBS today. HIV and trep orders placed    PACs on US with [facility]   Weekly NST's at 36 weeks.  Weekly auscultation of the fetal heart rate for 2 to 3 minutes.  Refer back to [facility] if there is tachycardia or bradycardia.  If there are 2 weeks in a row without any irregular beats seen, she can return to the routine prenatal care    Previous   - wants TOLAC 40+ wks (previous baby was born @ 39 weeks and feels the vision difficulties relate to him being a bit too early)    Patient Active Problem List   Diagnosis    Previous  section    Antepartum multigravida of advanced maternal age (HCC)    Supervision of other normal pregnancy, antepartum ([facility])       - Return to clinic in: 1 weeks    Daina Stubbs MD

## 2025-03-09 LAB — GROUP B STREP BY PCR FOR PCR OVT: NEGATIVE

## 2025-03-12 ENCOUNTER — ROUTINE PRENATAL (OUTPATIENT)
Dept: OBGYN CLINIC | Facility: CLINIC | Age: 40
End: 2025-03-12
Payer: COMMERCIAL

## 2025-03-12 VITALS — WEIGHT: 173 LBS | SYSTOLIC BLOOD PRESSURE: 100 MMHG | BODY MASS INDEX: 28 KG/M2 | DIASTOLIC BLOOD PRESSURE: 62 MMHG

## 2025-03-12 DIAGNOSIS — Z98.891 PREVIOUS CESAREAN SECTION: ICD-10-CM

## 2025-03-12 DIAGNOSIS — O09.529 ANTEPARTUM MULTIGRAVIDA OF ADVANCED MATERNAL AGE (HCC): ICD-10-CM

## 2025-03-12 DIAGNOSIS — Z34.80 SUPERVISION OF OTHER NORMAL PREGNANCY, ANTEPARTUM (HCC): Primary | ICD-10-CM

## 2025-03-12 PROCEDURE — 59025 FETAL NON-STRESS TEST: CPT | Performed by: OBSTETRICS & GYNECOLOGY

## 2025-03-12 NOTE — PROGRESS NOTES
ZULY, no complaints.    Denies pain, bleeding. LOF  Report regular fetal movement    NST - reactive       39 year old  at 36w6d    Return OB  Pre- Care: UTD.     PACs on US with MFM   Weekly NST's at 36 weeks.    Previous   - wants TOLAC 40+ wks (previous baby was born @ 39 weeks and feels the vision difficulties relate to him being a bit too early)  - cont to monitor and discuss, will consider cervix exam 38-39 wks.    Patient Active Problem List   Diagnosis    Previous  section    Antepartum multigravida of advanced maternal age (HCC)    Supervision of other normal pregnancy, antepartum ([facility])       - Return to clinic in: 1 weeks    Yani Kwon DO

## 2025-03-21 ENCOUNTER — HOSPITAL ENCOUNTER (OUTPATIENT)
Dept: PERINATAL CARE | Facility: HOSPITAL | Age: 40
Discharge: HOME | End: 2025-03-21
Attending: OBSTETRICS & GYNECOLOGY
Payer: COMMERCIAL

## 2025-03-21 ENCOUNTER — ROUTINE PRENATAL (OUTPATIENT)
Dept: OBGYN CLINIC | Facility: CLINIC | Age: 40
End: 2025-03-21
Payer: COMMERCIAL

## 2025-03-21 VITALS
WEIGHT: 174 LBS | DIASTOLIC BLOOD PRESSURE: 68 MMHG | HEART RATE: 80 BPM | BODY MASS INDEX: 28 KG/M2 | SYSTOLIC BLOOD PRESSURE: 110 MMHG

## 2025-03-21 VITALS
HEIGHT: 66 IN | SYSTOLIC BLOOD PRESSURE: 110 MMHG | WEIGHT: 174.19 LBS | DIASTOLIC BLOOD PRESSURE: 68 MMHG | BODY MASS INDEX: 27.99 KG/M2

## 2025-03-21 DIAGNOSIS — O28.8 NON-REACTIVE NST (NON-STRESS TEST): ICD-10-CM

## 2025-03-21 DIAGNOSIS — O28.8 NON-REACTIVE NST (NON-STRESS TEST): Primary | ICD-10-CM

## 2025-03-21 DIAGNOSIS — O36.8390 FETAL ARRHYTHMIA AFFECTING PREGNANCY, ANTEPARTUM (HCC): Primary | ICD-10-CM

## 2025-03-21 PROCEDURE — 76819 FETAL BIOPHYS PROFIL W/O NST: CPT | Performed by: OBSTETRICS & GYNECOLOGY

## 2025-03-21 PROCEDURE — 59025 FETAL NON-STRESS TEST: CPT | Performed by: OBSTETRICS & GYNECOLOGY

## 2025-03-21 PROCEDURE — 76815 OB US LIMITED FETUS(S): CPT

## 2025-03-21 NOTE — PROGRESS NOTES
RETURN OB VISIT    Nedra Jacobo is a 39 year old  at 38w1d presenting for return OB visit. Today she reports no contractions, vaginal bleeding or leaking fluid. Baby is moving well.    She had an NST today for suspected fetal arrhythmia which was non-reactive.   Declines SVE today.   Patient would like to proceed with TOLAC.   Advised patient to present to [facility] for further evaluation. Advised patient that observation or delivery may be advised today if evaluation is non-reassuring.     Follow up in 1wk.     Dena Sharma DO

## 2025-03-21 NOTE — PROGRESS NOTES
Outpatient Maternal-Fetal Medicine Ultrasound    Dear Dr. Kwon    Thank you for requesting a standard ultrasound on your patient Nedra Jacobo.  As you are aware she is a 39 year old female  with a zhang pregnancy and an Estimated Date of Delivery: 4/3/25. She was sent for a biophysical profile    VITAL SIGNS:  /68   Pulse 80   Wt 174 lb (78.9 kg)   LMP 2024 (Exact Date)   BMI 28.08 kg/m²     ULTRASOUND:    Ultrasound Findings:  Single IUP in cephalic presentation.    Placenta is anterior.   A 3 vessel cord is noted.  Cardiac activity is present at 136 bpm  MVP is 5.1 cm . JALEN 18.3 cm  BPP is 8/8.     See Imaging Tab For Complete Ultrasound Report    IMPRESSION:  IUP at 38w1d  BPP: 8/8    Thank you for allowing me to participate in the care of your patient.  Please do not hesitate to contact me if additional questions or concerns arise.      Danny Dorado M.D.    This was an ultrasound only encounter (no physician visit).  The ultrasound was read by Dr. Dorado and the report was sent to Dr. Kwon to discuss with the patient.    Note to patient and family  The 21st Century Cures Act makes medical notes available to patients in the interest of transparency.  However, please be advised that this is a medical document.  It is intended as [id number] communication.  It is written and medical language may contain abbreviations or verbiage that are technical and unfamiliar.  It may appear blunt or direct.  Medical documents are intended to carry relevant information, facts as evident, and the clinical opinion of the practitioner.

## 2025-03-24 ENCOUNTER — ANESTHESIA EVENT (OUTPATIENT)
Dept: OBGYN UNIT | Facility: HOSPITAL | Age: 40
End: 2025-03-24
Payer: COMMERCIAL

## 2025-03-24 ENCOUNTER — ANESTHESIA (OUTPATIENT)
Dept: OBGYN UNIT | Facility: HOSPITAL | Age: 40
End: 2025-03-24
Payer: COMMERCIAL

## 2025-03-24 ENCOUNTER — HOSPITAL ENCOUNTER (INPATIENT)
Facility: HOSPITAL | Age: 40
LOS: 3 days | Discharge: HOME | End: 2025-03-27
Attending: OBSTETRICS & GYNECOLOGY | Admitting: OBSTETRICS & GYNECOLOGY
Payer: COMMERCIAL

## 2025-03-24 ENCOUNTER — HOSPITAL ENCOUNTER (INPATIENT)
Facility: HOSPITAL | Age: 40
LOS: 3 days | Discharge: HOME HEALTH | End: 2025-03-27
Attending: OBSTETRICS & GYNECOLOGY | Admitting: OBSTETRICS & GYNECOLOGY
Payer: COMMERCIAL

## 2025-03-24 PROBLEM — Z34.90 PREGNANCY (HCC): Status: ACTIVE | Noted: 2025-03-24

## 2025-03-24 LAB
ANTIBODY SCREEN: POSITIVE
BASOPHILS # BLD AUTO: 0.07 X10(3) UL (ref 0–0.2)
BASOPHILS NFR BLD AUTO: 0.6 %
DEPRECATED RDW RBC AUTO: 43 FL (ref 35.1–46.3)
DIRECT COOMBS POLY: NEGATIVE
EOSINOPHIL # BLD AUTO: 0.05 X10(3) UL (ref 0–0.7)
EOSINOPHIL NFR BLD AUTO: 0.4 %
ERYTHROCYTE [DISTWIDTH] IN BLOOD BY AUTOMATED COUNT: 13.1 % (ref 11–15)
HCT VFR BLD AUTO: 35.5 %
HGB BLD-MCNC: 12.2 G/DL
IMM GRANULOCYTES # BLD AUTO: 0.06 X10(3) UL (ref 0–1)
IMM GRANULOCYTES NFR BLD: 0.5 %
LYMPHOCYTES # BLD AUTO: 1.83 X10(3) UL (ref 1–4)
LYMPHOCYTES NFR BLD AUTO: 15.8 %
MCH RBC QN AUTO: 31.4 PG (ref 26–34)
MCHC RBC AUTO-ENTMCNC: 34.4 G/DL (ref 31–37)
MCV RBC AUTO: 91.3 FL
MONOCYTES # BLD AUTO: 0.6 X10(3) UL (ref 0.1–1)
MONOCYTES NFR BLD AUTO: 5.2 %
NEUTROPHILS # BLD AUTO: 8.95 X10 (3) UL (ref 1.5–7.7)
NEUTROPHILS # BLD AUTO: 8.95 X10(3) UL (ref 1.5–7.7)
NEUTROPHILS NFR BLD AUTO: 77.5 %
PLATELET # BLD AUTO: 160 10(3)UL (ref 150–450)
PLATELETS.RETICULATED NFR BLD AUTO: 31.2 % (ref 0–7)
RBC # BLD AUTO: 3.89 X10(6)UL
RH BLOOD TYPE: NEGATIVE
T PALLIDUM AB SER QL IA: NONREACTIVE
WBC # BLD AUTO: 11.6 X10(3) UL (ref 4–11)

## 2025-03-24 RX ORDER — NALBUPHINE HYDROCHLORIDE 10 MG/ML
10 INJECTION INTRAMUSCULAR; INTRAVENOUS; SUBCUTANEOUS EVERY 6 HOURS PRN
Status: DISCONTINUED | OUTPATIENT
Start: 2025-03-24 | End: 2025-03-25 | Stop reason: HOSPADM

## 2025-03-24 RX ORDER — ACETAMINOPHEN 500 MG
500 TABLET ORAL EVERY 6 HOURS PRN
Status: DISCONTINUED | OUTPATIENT
Start: 2025-03-24 | End: 2025-03-25 | Stop reason: HOSPADM

## 2025-03-24 RX ORDER — HYDROXYZINE HYDROCHLORIDE 50 MG/ML
50 INJECTION, SOLUTION INTRAMUSCULAR EVERY 6 HOURS PRN
Status: DISCONTINUED | OUTPATIENT
Start: 2025-03-24 | End: 2025-03-25 | Stop reason: HOSPADM

## 2025-03-24 RX ORDER — NALBUPHINE HYDROCHLORIDE 10 MG/ML
2.5 INJECTION INTRAMUSCULAR; INTRAVENOUS; SUBCUTANEOUS
Status: DISCONTINUED | OUTPATIENT
Start: 2025-03-24 | End: 2025-03-27

## 2025-03-24 RX ORDER — LIDOCAINE HYDROCHLORIDE AND EPINEPHRINE 15; 5 MG/ML; UG/ML
INJECTION, SOLUTION EPIDURAL
Status: COMPLETED | OUTPATIENT
Start: 2025-03-24 | End: 2025-03-24

## 2025-03-24 RX ORDER — LIDOCAINE HYDROCHLORIDE 10 MG/ML
INJECTION, SOLUTION INFILTRATION; PERINEURAL
Status: COMPLETED | OUTPATIENT
Start: 2025-03-24 | End: 2025-03-24

## 2025-03-24 RX ORDER — LIDOCAINE HYDROCHLORIDE 10 MG/ML
30 INJECTION, SOLUTION EPIDURAL; INFILTRATION; INTRACAUDAL; PERINEURAL ONCE
Status: DISCONTINUED | OUTPATIENT
Start: 2025-03-24 | End: 2025-03-25 | Stop reason: HOSPADM

## 2025-03-24 RX ORDER — TERBUTALINE SULFATE 1 MG/ML
0.25 INJECTION SUBCUTANEOUS AS NEEDED
Status: DISCONTINUED | OUTPATIENT
Start: 2025-03-24 | End: 2025-03-25 | Stop reason: HOSPADM

## 2025-03-24 RX ORDER — BUPIVACAINE HYDROCHLORIDE 2.5 MG/ML
INJECTION, SOLUTION EPIDURAL; INFILTRATION; INTRACAUDAL; PERINEURAL
Status: COMPLETED | OUTPATIENT
Start: 2025-03-24 | End: 2025-03-24

## 2025-03-24 RX ORDER — ONDANSETRON 2 MG/ML
4 INJECTION INTRAMUSCULAR; INTRAVENOUS EVERY 6 HOURS PRN
Status: DISCONTINUED | OUTPATIENT
Start: 2025-03-24 | End: 2025-03-25 | Stop reason: HOSPADM

## 2025-03-24 RX ORDER — DEXTROSE, SODIUM CHLORIDE, SODIUM LACTATE, POTASSIUM CHLORIDE, AND CALCIUM CHLORIDE 5; .6; .31; .03; .02 G/100ML; G/100ML; G/100ML; G/100ML; G/100ML
INJECTION, SOLUTION INTRAVENOUS AS NEEDED
Status: DISCONTINUED | OUTPATIENT
Start: 2025-03-24 | End: 2025-03-25 | Stop reason: HOSPADM

## 2025-03-24 RX ORDER — BUPIVACAINE HYDROCHLORIDE 2.5 MG/ML
20 INJECTION, SOLUTION EPIDURAL; INFILTRATION; INTRACAUDAL; PERINEURAL ONCE
Status: COMPLETED | OUTPATIENT
Start: 2025-03-24 | End: 2025-03-24

## 2025-03-24 RX ORDER — ACETAMINOPHEN 500 MG
1000 TABLET ORAL EVERY 6 HOURS PRN
Status: DISCONTINUED | OUTPATIENT
Start: 2025-03-24 | End: 2025-03-25 | Stop reason: HOSPADM

## 2025-03-24 RX ORDER — IBUPROFEN 600 MG/1
600 TABLET, FILM COATED ORAL ONCE AS NEEDED
Status: DISCONTINUED | OUTPATIENT
Start: 2025-03-24 | End: 2025-03-25 | Stop reason: HOSPADM

## 2025-03-24 RX ORDER — CITRIC ACID/SODIUM CITRATE 334-500MG
30 SOLUTION, ORAL ORAL AS NEEDED
Status: DISCONTINUED | OUTPATIENT
Start: 2025-03-24 | End: 2025-03-25 | Stop reason: HOSPADM

## 2025-03-24 RX ORDER — SODIUM CHLORIDE, SODIUM LACTATE, POTASSIUM CHLORIDE, CALCIUM CHLORIDE 600; 310; 30; 20 MG/100ML; MG/100ML; MG/100ML; MG/100ML
INJECTION, SOLUTION INTRAVENOUS CONTINUOUS
Status: DISCONTINUED | OUTPATIENT
Start: 2025-03-24 | End: 2025-03-25 | Stop reason: HOSPADM

## 2025-03-24 RX ORDER — BUPIVACAINE HCL/0.9 % NACL/PF 0.25 %
5 PLASTIC BAG, INJECTION (ML) EPIDURAL AS NEEDED
Status: DISCONTINUED | OUTPATIENT
Start: 2025-03-24 | End: 2025-03-27

## 2025-03-24 RX ADMIN — LIDOCAINE HYDROCHLORIDE 5 ML: 10 INJECTION, SOLUTION INFILTRATION; PERINEURAL at 15:30:00

## 2025-03-24 RX ADMIN — BUPIVACAINE HYDROCHLORIDE 5 ML: 2.5 INJECTION, SOLUTION EPIDURAL; INFILTRATION; INTRACAUDAL; PERINEURAL at 15:42:00

## 2025-03-24 RX ADMIN — LIDOCAINE HYDROCHLORIDE AND EPINEPHRINE 5 ML: 15; 5 INJECTION, SOLUTION EPIDURAL at 15:39:00

## 2025-03-24 NOTE — ANESTHESIA PREPROCEDURE EVALUATION
Anesthesia PreOp Note    HPI:     Nedra Jacobo is a 39 year old female who presents for preoperative consultation requested by: * No surgeons listed *    Date of Surgery: 3/24/2025    * No procedures listed *  Indication: * No pre-op diagnosis entered *    Relevant Problems   No relevant active problems       NPO:  Last Liquid Consumption Date: 25  Last Liquid Consumption Time: 0700  Last Solid Consumption Date: 25  Last Solid Consumption Time: 0700  Last Liquid Consumption Date: 25          History Review:  Patient Active Problem List    Diagnosis Date Noted    Pregnancy ([facility]) 2025    Supervision of other normal pregnancy, antepartum ([facility]) 2025    Antepartum multigravida of advanced maternal age ([facility]) 2024    Previous  section 2024       Past Medical History:    Asthma ([facility])    Lyme disease    As a teenager       Past Surgical History:   Procedure Laterality Date      2022    Due to breach position       Prescriptions Prior to Admission[1]  Current Medications and Prescriptions Ordered in Epic[2]    Allergies[3]    Family History   Problem Relation Age of Onset    Hypertension Father     Pulmonary Disease Father         COPD    Renal Disease Father     Asthma Mother     Other (Nystagmus) Son     Other (Persistent fetal vasculature) Son     Depression Maternal Grandmother     Psoriasis Brother      Social History     Socioeconomic History    Marital status:    Tobacco Use    Smoking status: Never    Smokeless tobacco: Never   Vaping Use    Vaping status: Never Used   Substance and Sexual Activity    Alcohol use: Not Currently     Comment: Very rarely do i have some alcohol    Drug use: Never    Sexual activity: Yes     Partners: Male   Other Topics Concern    Caffeine Concern No    Special Diet No    Weight Concern No    Exercise Yes     Comment: Once or twice a week    Blood Transfusions No    Seat Belt No       Available pre-op labs  reviewed.  Lab Results   Component Value Date    WBC 11.6 (H) 03/24/2025    RBC 3.89 03/24/2025    HGB 12.2 03/24/2025    HCT 35.5 03/24/2025    MCV 91.3 03/24/2025    MCH 31.4 03/24/2025    MCHC 34.4 03/24/2025    RDW 13.1 03/24/2025    .0 03/24/2025             Vital Signs:  Body mass index is 28.11 kg/m².   height is 1.676 m (5' 6\") and weight is 79 kg (174 lb 2.6 oz). Her oral temperature is 98.7 °F (37.1 °C). Her blood pressure is 120/81 and her pulse is 85. Her respiration is 16.   Vitals:    03/24/25 0830 03/24/25 1015 03/24/25 1030 03/24/25 1311   BP: 131/76   120/81   Pulse: 88   85   Resp:    16   Temp: 98.1 °F (36.7 °C)  98.2 °F (36.8 °C) 98.7 °F (37.1 °C)   TempSrc: Oral  Oral Oral   Weight:  79 kg (174 lb 2.6 oz)     Height:  1.676 m (5' 6\")          Anesthesia Evaluation     Patient summary reviewed and Nursing notes reviewed    No history of anesthetic complications   Airway   Mallampati: II  TM distance: <3 FB  Neck ROM: full  Dental - Dentition appears grossly intact     Pulmonary - normal exam   (+) asthma  Cardiovascular - negative ROS and normal exam  Exercise tolerance: good    Neuro/Psych - negative ROS     GI/Hepatic/Renal - negative ROS     Endo/Other - negative ROS   Abdominal                  Anesthesia Plan:   ASA:  2  Emergent    Plan:   Epidural  Post-op Pain Management: Continuous epidural  Plan Comments: Neuraxial anesthesia was explained in detail to the patient, addressing the technique, intended outcome and known adverse events namely spinal or epidural bleeding, infection, post dural puncture headaches, complete spinal block with resulting cardiovascular and respiratory failure, block failure and or need for multiple attempts or switching to general anesthesia.   Informed Consent Plan and Risks Discussed With:  Patient  Discussed plan with:  Attending      I have informed Nedrakamila Jacobo and/or legal guardian or family member of the nature of the anesthetic plan, benefits,  risks including possible dental damage if relevant, major complications, and any alternative forms of anesthetic management.   All of the patient's questions were answered to the best of my ability. The patient desires the anesthetic management as planned.  Ulysses Larson MD  3/24/2025 3:17 PM  Present on Admission:  **None**           [1]   Medications Prior to Admission   Medication Sig Dispense Refill Last Dose/Taking    ASPIRIN 81 OR Take by mouth.   3/23/2025    magnesium 250 MG Oral Tab Take 1 tablet (250 mg total) by mouth.   3/23/2025    Ferrous Gluconate (IRON 27 OR) Take by mouth.   3/23/2025    prenatal vitamin with DHA 27-0.8-228 MG Oral Cap Take 1 capsule by mouth daily.   3/23/2025    Cholecalciferol (VITAMIN D) 50 MCG (2000 UT) Oral Cap Take by mouth.   3/23/2025    metoclopramide (REGLAN) 10 MG Oral Tab Take 1 tablet (10 mg total) by mouth every 6 (six) hours as needed. (Patient not taking: Reported on 3/7/2025) 30 tablet 3     fluticasone propionate 50 MCG/ACT Nasal Suspension 2 sprays by Each Nare route daily. (Patient not taking: Reported on 3/7/2025) 18 mL 0     valACYclovir 1 G Oral Tab Take 1 tablet (1,000 mg total) by mouth as needed. (Patient not taking: Reported on 1/2/2025)      [2]   Current Facility-Administered Medications Ordered in Epic   Medication Dose Route Frequency Provider Last Rate Last Admin    acetaminophen (Tylenol Extra Strength) tab 500 mg  500 mg Oral Q6H PRN Daina Stubbs MD        acetaminophen (Tylenol Extra Strength) tab 1,000 mg  1,000 mg Oral Q6H PRN Daina Stubbs MD        ibuprofen (Motrin) tab 600 mg  600 mg Oral Once PRN Daina Stubbs MD        ondansetron (Zofran) 4 MG/2ML injection 4 mg  4 mg Intravenous Q6H PRN Daina Stubbs MD        oxyTOCIN in sodium chloride 0.9% (Pitocin) 30 Units/500mL infusion premix  62.5-900 dannie-units/min Intravenous Continuous Daina Stubbs MD   Held at 03/24/25 0845    terbutaline (Brethine) 1 MG/ML  injection 0.25 mg  0.25 mg Subcutaneous PRN Daina Stubbs MD        sodium citrate-citric acid (Bicitra) 500-334 MG/5ML oral solution 30 mL  30 mL Oral PRN Daina Stubbs MD        lidocaine PF (Xylocaine-MPF) 1% injection  30 mL Intradermal Once Daina Stubbs MD        lactated ringers infusion   Intravenous Continuous Daina Stubbs MD   Held at 03/24/25 0845    dextrose in lactated ringers 5% infusion   Intravenous PRN Daina Stubbs MD        lactated ringers IV bolus 500 mL  500 mL Intravenous PRN Daina Stubbs MD        nalbuphine (Nubain) 10 mg/mL injection 10 mg  10 mg Intramuscular Q6H PRN Daina Stubbs MD        And    hydrOXYzine 50 mg/mL injection 50 mg  50 mg Intramuscular Q6H PRN Daina Stubbs MD        fentaNYL (Sublimaze) 50 mcg/mL injection 100 mcg  100 mcg Intravenous Once Daina Stubbs MD        fentaNYL (Sublimaze) 50 mcg/mL injection 50 mcg  50 mcg Intravenous Q30 Min PRN Daina Stubbs MD        lactated ringers IV bolus 1,000 mL  1,000 mL Intravenous Once Ulysses Larson MD 2,000 mL/hr at 03/24/25 1435 1,000 mL at 03/24/25 1435    fentaNYL-bupivacaine 2 mcg/mL-0.125% in sodium chloride 0.9% 100 mL EPIDURAL infusion premix   Epidural Continuous Ulysses Larson MD        fentaNYL (Sublimaze) 50 mcg/mL injection 100 mcg  100 mcg Epidural Once Ulysses Larson MD        bupivacaine PF (Marcaine) 0.25% injection  20 mL Epidural Once Ulysses Larson MD        ePHEDrine (PF) 25 MG/5 ML injection 5 mg  5 mg Intravenous PRN Ulysses Larson MD        nalbuphine (Nubain) 10 mg/mL injection 2.5 mg  2.5 mg Intravenous Q15 Min PRN Ulysses Larson MD         No current Epic-ordered outpatient medications on file.   [3]   Allergies  Allergen Reactions    Allegra [Fexofenadine] HIVES    Antihistamine & Nasal Deconges [Fexofenadine-Pseudoephedrine] HIVES    Levaquin MYALGIA     Joint pain    Bactrim [Sulfamethoxazole W/Trimethoprim] TINITUS

## 2025-03-24 NOTE — PROGRESS NOTES
S: Patient comfortable with epidural    O: AFVSS  Gen - NAD  FHT's 130s, moderate variability, positive accels, no decels  SVE- 2/80/-2    A/P. 38w4d, PROM, TOLAC  Start pitocin. Patient and partner agree with current managment recommendation.  Daina Stubbs MD

## 2025-03-24 NOTE — PLAN OF CARE
Problem: BIRTH - VAGINAL/ SECTION  Goal: Fetal and maternal status remain reassuring during the birth process  Description: INTERVENTIONS:- Monitor vital signs- Monitor fetal heart rate- Monitor uterine activity- Monitor labor progression (vaginal delivery)- DVT prophylaxis (C/S delivery)- Surgical antibiotic prophylaxis (C/S delivery)  Outcome: Progressing     Problem: PAIN - ADULT  Goal: Verbalizes/displays adequate comfort level or patient's stated pain goal  Description: INTERVENTIONS:- Encourage pt to monitor pain and request assistance- Assess pain using appropriate pain scale- Administer analgesics based on type and severity of pain and evaluate response- Implement non-pharmacological measures as appropriate and evaluate response- Consider cultural and social influences on pain and pain management- Manage/alleviate anxiety- Utilize distraction and/or relaxation techniques- Monitor for opioid side effects- Notify MD/LIP if interventions unsuccessful or patient reports new pain- Anticipate increased pain with activity and pre-medicate as appropriate  Outcome: Progressing     Problem: ANXIETY  Goal: Will report anxiety at manageable levels  Description: INTERVENTIONS:- Administer medication as ordered- Teach and rehearse alternative coping skills- Provide emotional support with 1:1 interaction with staff  Outcome: Progressing     Problem: Patient Centered Care  Goal: Patient preferences are identified and integrated in the patient's plan of care  Description: Interventions:- What would you like us to know as we care for you? Had previous   - Provide timely, complete, and accurate information to patient/family- Incorporate patient and family knowledge, values, beliefs, and cultural backgrounds into the planning and delivery of care- Encourage patient/family to participate in care and decision-making at the level they choose- Honor patient and family perspectives and choices  Outcome:  Progressing     Problem: Patient/Family Goals  Goal: Patient/Family Long Term Goal  Description: Patient's Long Term Goal: uncomplicated vaginal delivery   Interventions:-   - See additional Care Plan goals for specific interventions  Outcome: Progressing  Goal: Patient/Family Short Term Goal  Description: Patient's Short Term Goal: pain control  Interventions: - epidural  - See additional Care Plan goals for specific interventions  Outcome: Progressing

## 2025-03-24 NOTE — ANESTHESIA PROCEDURE NOTES
Labor Analgesia    Date/Time: 3/24/2025 3:30 PM    Performed by: Ulysses Larson MD  Authorized by: Ulysses Larson MD      General Information and Staff    Start Time:  3/24/2025 3:30 PM  End Time:  3/24/2025 3:39 PM  Anesthesiologist:  Ulysses Larson MD  Performed by:  Anesthesiologist  Patient Location:  OB  Reason for Block: labor epidural    Preanesthetic Checklist: patient identified, IV checked, site marked, risks and benefits discussed, monitors and equipment checked, pre-op evaluation, timeout performed, anesthesia consent and sterile technique used      Procedure Details    Patient Position:  Sitting  Prep: ChloraPrep    Monitoring:  Heart rate  Approach:  Midline    Epidural Needle    Injection Technique:  JAMES air  Needle Type:  Tuohy  Needle Gauge:  18 G  Needle Length:  3.5 in  Needle Insertion Depth:  5  Location:  L3-4    Spinal Needle      Catheter    Catheter Type:  Multi-orifice  Catheter Size:  20 G  Catheter at Skin Depth:  10  Test Dose:  Negative    Assessment      Additional Comments

## 2025-03-24 NOTE — H&P
[facility]  Obstetrics and Gynecology  History & Physical    Nedra Jacobo Patient Status:  Inpatient    1985 MRN [id number]   Location [facility] Attending Daina Stubbs MD   Hospital Day 0 PCP Tosha Liu DO     CC: Patient is here for ROM    SUBJECTIVE:    Nedra Jacobo is a 39 year old  female at 38w4d Estimated Date of Delivery: 4/3/25 who is being admitted for PROM. Her current obstetrical history is significant for AMA and previous  section for breech, intermittent fetal PACs . Patient reports leaking clear fluid since 630 today.     {positive UCx.    negative VB.   positive LOF.    positive Fetal movement.   negative Nausea, Vomiting, headache, vision changes and RUQ/Epigastric pain.       TAMIKO Confirmation  LMP: Patient's last menstrual period was 2024 (exact date).  TAMIKO: 4/3/2025, by Last Menstrual Period     OB Ultrasounds:   1) OB US 25  Ultrasound Findings:  Single IUP in cephalic presentation.    Placenta is anterior.   A 3 vessel cord is noted.  Cardiac activity is present at 135 bpm  EFW 1907 g ( 4 lb 3 oz); 49%.    JALEN is  16.8 cm.  MVP is 4.7 cm  BPP is 8/8.      +PACs seen intermittently during the exam.  Obstetric History:   OB History    Para Term  AB Living   3 1 1   1 1   SAB IAB Ectopic Multiple Live Births   1       1      # Outcome Date GA Lbr Britton/2nd Weight Sex Type Anes PTL Lv   3 Current            2 SAB 10/01/23           1 Term 22 39w1d  7 lb 4.9 oz (3.315 kg) M CS-LTranv Spinal N TRINY     Past Medical History:   Past Medical History:    Asthma (HCC)    Lyme disease    As a teenager     Past Social History:   Past Surgical History:   Procedure Laterality Date      2022    Due to breach position     Family History:   Family History   Problem Relation Age of Onset    Hypertension Father     Pulmonary Disease Father         COPD    Renal Disease Father      Asthma Mother     Other (Nystagmus) Son     Other (Persistent fetal vasculature) Son     Depression Maternal Grandmother     Psoriasis Brother      Social History:   Social History     Tobacco Use    Smoking status: Never    Smokeless tobacco: Never   Substance Use Topics    Alcohol use: Not Currently     Comment: Very rarely do i have some alcohol       Home Meds: Prescriptions Prior to Admission[1]  Allergies: Allergies[2]    OBJECTIVE:    Pulse:  [88] 88  BP: (131)/(76) 131/76  There is no height or weight on file to calculate BMI.    General: AAO. NAD.   Lungs: Respirations non labored   CV: peripheral pulses +2 bilaterally   Abdomen: FHT present, gravid   Extremities: negative edema bilaterally, negative calf tenderness bilaterally    FHT: moderate variability/130 BPM / Positive accelerations/Negative decelerations   TOCO: q 7-8 minutes    SVE: /-2 per RN       Prenatal Labs Brief Review   Blood Type:   Lab Results   Component Value Date    ABO O 2024    RH Negative 2024     GBS:  Negative      Inpatient labs:       ASSESSMENT/ PLAN:    Nedra Jacobo is a 39 year old  female at 38w4d Estimated Date of Delivery: 4/3/25 who is being admitted for PROM.    Patient Active Problem List   Diagnosis    Previous  section    Antepartum multigravida of advanced maternal age ([facility]CC)    Supervision of other normal pregnancy, antepartum ([facility])    Pregnancy ([facility])       1. Labor: patient previously counseled on TOLAC vs repeat  section. Desires KIET. Reviewed risks. Expectant managment for now. Given PROM, if no regular ctx or labor progress after 6 hours from ROM, consider pitocin AOL  2. Fetal monitoring: CEFM  3. GBS: negative   4. Pain: ok for epidural if desired    Risks, benefits, alternatives and possible complications have been discussed in detail with the patient.  Pre-admission, admission, and post admission procedures and expectations were discussed in detail.  All questions  answered, all appropriate consents signed at the Hospital. Admission is planned for today.     MD BRYNN Martinez OBGYN           [1]   Medications Prior to Admission   Medication Sig Dispense Refill Last Dose/Taking    ASPIRIN 81 OR Take by mouth.   3/23/2025    magnesium 250 MG Oral Tab Take 1 tablet (250 mg total) by mouth.   3/23/2025    Ferrous Gluconate (IRON 27 OR) Take by mouth.   3/23/2025    prenatal vitamin with DHA 27-0.8-228 MG Oral Cap Take 1 capsule by mouth daily.   3/23/2025    Cholecalciferol (VITAMIN D) 50 MCG (2000 UT) Oral Cap Take by mouth.   3/23/2025    metoclopramide (REGLAN) 10 MG Oral Tab Take 1 tablet (10 mg total) by mouth every 6 (six) hours as needed. (Patient not taking: Reported on 3/7/2025) 30 tablet 3     fluticasone propionate 50 MCG/ACT Nasal Suspension 2 sprays by Each Nare route daily. (Patient not taking: Reported on 3/7/2025) 18 mL 0     valACYclovir 1 G Oral Tab Take 1 tablet (1,000 mg total) by mouth as needed. (Patient not taking: Reported on 1/2/2025)      [2]   Allergies  Allergen Reactions    Allegra [Fexofenadine] HIVES    Antihistamine & Nasal Deconges [Fexofenadine-Pseudoephedrine] HIVES    Levaquin MYALGIA     Joint pain    Bactrim [Sulfamethoxazole W/Trimethoprim] TINITUS

## 2025-03-24 NOTE — PROGRESS NOTES
Pt is a 39 year old female admitted to TR1/TR1-A.     Chief Complaint   Patient presents with    R/o Labor    R/o Rom      Pt is  38w4d intra-uterine pregnancy.  History obtained, consents signed. Oriented to room, staff, and plan of care.

## 2025-03-24 NOTE — PROGRESS NOTES
Pt is a 39 year old female admitted to LDR7/LDR7-A.     Chief Complaint   Patient presents with    R/o Labor    R/o Rom      Pt is  38w4d intra-uterine pregnancy.  History obtained, consents signed. Oriented to room, staff, and plan of care.

## 2025-03-25 PROBLEM — O34.219 VBAC (VAGINAL BIRTH AFTER CESAREAN) (HCC): Status: ACTIVE | Noted: 2025-03-25

## 2025-03-25 PROBLEM — Z67.91 RH NEGATIVE, ANTEPARTUM (HCC): Status: ACTIVE | Noted: 2025-03-25

## 2025-03-25 PROBLEM — O34.219 PREVIOUS CESAREAN DELIVERY AFFECTING PREGNANCY, DELIVERED (HCC): Status: ACTIVE | Noted: 2024-08-19

## 2025-03-25 PROBLEM — O26.899 RH NEGATIVE, ANTEPARTUM (HCC): Status: ACTIVE | Noted: 2025-03-25

## 2025-03-25 LAB — FETAL SCREEN RESULT: NEGATIVE

## 2025-03-25 PROCEDURE — 0KQM0ZZ REPAIR PERINEUM MUSCLE, OPEN APPROACH: ICD-10-PCS | Performed by: OBSTETRICS & GYNECOLOGY

## 2025-03-25 PROCEDURE — 3E0334Z INTRODUCTION OF SERUM, TOXOID AND VACCINE INTO PERIPHERAL VEIN, PERCUTANEOUS APPROACH: ICD-10-PCS | Performed by: OBSTETRICS & GYNECOLOGY

## 2025-03-25 PROCEDURE — 59400 OBSTETRICAL CARE: CPT | Performed by: OBSTETRICS & GYNECOLOGY

## 2025-03-25 RX ORDER — SIMETHICONE 80 MG
80 TABLET,CHEWABLE ORAL 3 TIMES DAILY PRN
Status: DISCONTINUED | OUTPATIENT
Start: 2025-03-25 | End: 2025-03-27

## 2025-03-25 RX ORDER — IBUPROFEN 600 MG/1
600 TABLET, FILM COATED ORAL EVERY 6 HOURS
Status: DISCONTINUED | OUTPATIENT
Start: 2025-03-25 | End: 2025-03-27

## 2025-03-25 RX ORDER — BISACODYL 10 MG
10 SUPPOSITORY, RECTAL RECTAL ONCE AS NEEDED
Status: DISCONTINUED | OUTPATIENT
Start: 2025-03-25 | End: 2025-03-27

## 2025-03-25 RX ORDER — ACETAMINOPHEN 500 MG
1000 TABLET ORAL EVERY 6 HOURS PRN
Status: DISCONTINUED | OUTPATIENT
Start: 2025-03-25 | End: 2025-03-27

## 2025-03-25 RX ORDER — ACETAMINOPHEN 500 MG
500 TABLET ORAL EVERY 6 HOURS PRN
Status: DISCONTINUED | OUTPATIENT
Start: 2025-03-25 | End: 2025-03-27

## 2025-03-25 RX ORDER — AMMONIA 15 % (W/V)
0.3 AMPUL (EA) INHALATION AS NEEDED
Status: DISCONTINUED | OUTPATIENT
Start: 2025-03-25 | End: 2025-03-27

## 2025-03-25 RX ORDER — DOCUSATE SODIUM 100 MG/1
100 CAPSULE, LIQUID FILLED ORAL
Status: DISCONTINUED | OUTPATIENT
Start: 2025-03-25 | End: 2025-03-27

## 2025-03-25 NOTE — PROGRESS NOTES
Patient up to bathroom with assist x 2.Unable to void at this time. Patient transferred to mother/baby room 369 per wheelchair in stable condition with baby and personal belongings.  Accompanied by significant other and staff.  Report given to mother/baby Carina LANDAVERDE.

## 2025-03-25 NOTE — ANESTHESIA POSTPROCEDURE EVALUATION
Patient: Nedra Jacobo    Procedure Summary       Date: 03/24/25 Room / Location:     Anesthesia Start: 1530 Anesthesia Stop: 03/25/25 0058    Procedure: LABOR ANALGESIA Diagnosis:     Scheduled Providers:  Anesthesiologist: Ulysses Larson MD    Anesthesia Type: epidural ASA Status: 2 - Emergent            Anesthesia Type: epidural    Vitals Value Taken Time   /68 03/25/25 0146   Temp 98.7 °F (37.1 °C) 03/25/25 0115   Pulse 67 03/25/25 0155   Resp  03/25/25 0531   SpO2 98 % 03/25/25 0155   Vitals shown include unfiled device data.    [facility] AN Post Evaluation:   Patient Evaluated in floor  Patient Participation: complete - patient participated  Level of Consciousness: awake and alert  Pain Score: 0  Pain Management: adequate  Airway Patency:patent  Yes    Nausea/Vomiting: none  Cardiovascular Status: acceptable  Respiratory Status: acceptable  Postoperative Hydration acceptable      Ulysses Larson MD  3/25/2025 5:31 AM

## 2025-03-25 NOTE — L&D DELIVERY NOTE
Donnell, Girl [[id number]]      Labor Events     labor?: No   steroids?: None  Antibiotics received during labor?: No  Rupture date/time: 3/24/2025 0630     Rupture type: SROM  Fluid color: Clear  Labor type: Spontaneous Onset of Labor  Augmentation: Nipple Stimulation, Oxytocin  Indications for augmentation: Ineffective Contraction Pattern  Intrapartum & labor complications: Variable decelerations       Labor Length    1st stage: 2h 10m  2nd stage: 1h 19m  3rd stage: 0h 03m       Labor Event Times    Labor onset date/time: 3/24/2025 2125  Dilation complete date/time: 3/24/2025 2335  Start pushing date/time: 3/25/2025 0025        Presentation    Presentation: Vertex  Position: Left Occiput Anterior       Operative Delivery    Operative Vaginal Delivery: N/A                Shoulder Dystocia    Shoulder Dystocia: No       Anesthesia    Method: Epidural              [place] Delivery      Head delivery date/time: 3/25/2025 00:54:49   Delivery date/time:  3/25/25 00:54:57   Delivery type: Vaginal birth after caesarian    Details:     Delivery location: delivery room  Delivery Room Temperature: 72       Delivery Providers    Delivering Clinician: Daina Stubbs MD   Delivery personnel:  Provider Role   Gaye Sher RN Baby Nurse   Neisha Jones, RN Delivery Nurse             Cord    Complications: Nuchal, Body cord  # of loops: 2  Timed cord clamping: Yes  Time in sec: 80  Cord blood disposition: to lab  Gases sent?: No       Resuscitation    Method: None        Measurements      Weight: 2722 g 6 lb Length: 49 cm     Head circum.: 33 cm              Placenta    Date/time: 3/25/2025 0058  Removal: Spontaneous  Appearance: Intact  Disposition: Discarded       Apgars    Living status: Living   Apgar Scoring Key:    0 1 2    Skin color Blue or pale Acrocyanotic Completely pink    Heart rate Absent <100 bpm >100 bpm    Reflex irritability No response Grimace Cry or  active withdrawal    Muscle tone Limp Some flexion Active motion    Respiratory effort Absent Weak cry; hypoventilation Good, crying              1 Minute:  5 Minute:  10 Minute:  15 Minute:  20 Minute:      Skin color: 0  1       Heart rate: 2  2       Reflex irritablity: 2  2       Muscle tone: 2  2       Respiratory effort: 2  2       Total: 8  9          Apgars assigned by: CONTRERAS LANDAVERDE   disposition: with mother       Skin to Skin    Skin to skin initiated date/time: 3/25/2025 0055  Skin to skin with: Mother       Vaginal Count    Initial count RN: Cristy Evans RN  Initial count Tech: Pabon, Joslyn   Sponges   Sharps    Initial counts 10   0    Final counts 10   1    Final count RN: Neisha Jones RN  Final count MD: Daina Stubbs MD       Lacerations    Episiotomy: None  Perineal lacerations: 2nd Repaired?: Yes     Vaginal laceration?: No      Cervical laceration?: No    Clitoral laceration?: No    Quantitative blood loss (mL): 250              [facility]   Vaginal Delivery Procedure Note      Nedra Jacobo Patient Status:  Inpatient    1985 MRN [id number]   Location [facility] Attending Daina Stubbs MD   Hosp Day # 1 PCP Tosha Liu DO     Date of Delivery: 25    Pre procedure diagnosis:  IUP at Term, previous  section     Post procedure diagnosis: Same, delivered, successful     Procedure: Normal Spontaneous Vaginal Delivery    Attending: Daina Stubbs MD      Anesthesia: Epidural    QBL:  Quantitative Blood Loss (mL) 250     Indications:  Patient is a 39 year old  at 38w5d who presented with PROM, she progressed to complete cervical dilation with pitocin augmentation       Findings:    Sex: female     Weight:pending      Apgars: 8/9      Lacerations: 2nd degree perineal laceration, no periurethral, no cervical       Procedure:  The patient was confirmed to be completely dilated. The patients  was placed in the dorsolithotomy position.  She was then encouraged to push.  As the head was delivered in REAL position, the perineum was supported to decrease the risk of tearing. The shoulders rotated spontaneously and delivery was completed without complication.  The cord was doubly clamped then cut after 60 seconds.  The baby was placed on the mother's abdomen at her request.  IV pitocin bolus was initiated.  The cord blood was sampled. The placenta then delivered intact. The uterus was noted to be firm. Good hemostasis was noted. The perineum, vaginal mucosa and cervix was then examined.       A 2nd degree perineal laceration repaired with 2-0 vicryl.  Bleeding was minimal.  The patient was then moved to the supine position in stable condition.  Sponge, needle, and instrument counts were correct.    Complications:  None     Mother and infant in good condition in the delivery room.    MD AYANNA Martinez

## 2025-03-25 NOTE — DISCHARGE INSTRUCTIONS
Post Vaginal Delivery Home Care Instructions     We hope you were pleased with your care at [facility].  We wish you the best outcome and overall experience with the delivery of your baby.  These instructions will help to minimize pain, limit the risk for an infection, and improve the likelihood of a successful recovery.    What to Expect:  Abdominal cramping after delivery especially if you are breastfeeding.   Vaginal bleeding for about 4-6 weeks that may be followed by a yellow or white discharge for a few more weeks.  Your period will resume in approximately 6-8 weeks, unless you are breastfeeding.    If you are bottle feeding, you may notice breast engorgement in about 3 days.  Your breast may be sore and hard. Please wear a tight fitted bra or sports bra for 24-36 hours to help prevent your breast from producing milk, and use ice packs to relive any discomfort.  If you are breastfeeding, nipple dryness is very common the first few days.    Constipation is common after having a baby.  Please increase fluid and fiber in your diet.      Over-The-Counter Medication  Non-prescription anti-inflammatory medications can also help to ease the pain.  You may take Tylenol or Ibuprofen   Colace or Metamucil for Constipation  Lanolin for dry nipples  Tucks, Witch Hazel and Epifoam for vaginal/perineum discomfort.   Drink a full glass of water with oral medication and take as directed.    Wound Care  The following instructions will promote proper healing and help to prevent infection  Vaginal/perineum Care: Sitz Bath for 15mins, 2-3 times a day,    Bathing/Showers  You may resume showers  No baths, swimming, hot tubs until your post-partum visit    Home Medication  Resume your home medications as instructed    Diet  Resume your normal diet    Activity  Refrain from vaginal intercourse, vaginal suppositories, tampon use or douches until after your post-partum visit.  No exercising for 4 weeks  You may climb  stairs minimally for the 1st week.    Do not do heavy housework for at least 2-3 weeks    Return to Work or School  You may return to work in approximately 6 weeks  Contact your obstetrician’s office, if you need a medical release. ([phone])    Follow-up Appointment with Your Obstetrician  Call your obstetrician’s office today for an appointment in 4-6 weeks.    The number is [phone].  Verify your appointment date, day, time, and location.  At your 1st post-partum office visit:  Your progress will be evaluated, findings reviewed, and any additional concerns and instructions will be discussed.    Questions or Concerns  Call your obstetrician’s office if you experience the following:  Severe pain not controlled by pain medication  Foul smelling vaginal discharge  Heavy bleeding  Shortness of breath  Fever  Crying and periods of sadness that prevents you from caring for yourself and your baby  Burning sensation during urination or inability to urinate  Swelling, redness or abnormal warmth to your leg/calf  Please call ([phone]). If your call is made after office hours, a physician will be available to help you.  There is always a provider covering our patients.    Thank you for coming to [facility] to start your new family.  The nurses and the anesthesiologists try very hard to make sure you receive the best care possible.  Your trust in them as well as us is greatly appreciated.    Thanks so much,   The Providers of [facility]      BREAST ENGORGEMENT MANAGEMENT    Prior to handling baby, your breasts, or breast pump, remember to wash hands with soap and water.    Prevent and treat engorgement; Increase milk let downs prior to breastfeeding or pumping:   Snuggle your baby in skin to skin contact between and during feedings whenever possible.  Before nursing or pumping:     Gently massage breasts while reclined or lying flat with unrefined coconut oil or olive oil to  reduce friction and increase desired response.  Massage toward armpit to allow drainage of excess fluid collecting in breast tissue causing swelling and resistance to milk flow.   While reclined, perform breast gymnastics on each breast individually, place breast between both hands and move side to side, up and down, and around in circular motion to allow a clear pathway for drainage of unnecessary fluid.  If areolar tissue remain dense, tight, or full apply gentle reverse compression on areola to soften the area to aid in latching/pumping/emptying: place thumbs on each side of nipple, gentle push in toward chest wall for a count of 3, rotate around nipple until tissue becomes more elastic and less dense at the surface.     Breastfeed on demand, wake baby by 1 ½ to 3   hours to feed if sleepy.     Deep Latching on:  Try different positions, with baby's chin pointed toward the plugged area if possible  Express drops of milk onto your baby's lips to encourage licking.  Point your nipple to baby's nose  Stroke nipple lightly down center of lips  Wait for wide mouth with tongue cupped at bottom of mouth.  Infant's chin should be deep into breast, with some room between nose and the breast.     Breast compression: as needed during nursing to increase infant swallowing and soften firm areas. This can be done while pumping as well.    Pump to soften the breast if still uncomfortably full after nursing or baby is uninterested in nursing.    Apply cold compresses for 10 minutes on your breast if needed to decrease breast swelling after nursing or pumping:     Rest and drink plenty of fluids.    Check with your doctor about taking ibuprofen for relief of swelling and discomfort.    Call your OB doctor with any signs of   mastitis (breast infection)  Plugged area(s) are not soft within 24 hours.   Breast becomes firm, reddened, or painful.   Fever, chills, or flu-like symptoms. Check your temperature 3 times daily until a  plugged duct or mastitis resolves.    If mastitis occurs:  Continue breastfeeding and/or pumping - your milk is not infected.   Continue above treatment for relieving plugged area(s)  Continue to take antibiotic as prescribed even though you may quickly feel better.   Contact your doctor if you are not feeling significantly better within 1-2 days of starting antibiotic, sooner if symptoms worsen.    Call lactation consultant [phone] for guidance as needed.     Schedule an outpatient lactation appointment for evaluation if engorgement, plugged duct or mastitis reoccurs.    For additional information:   [employer] website [email or web address]

## 2025-03-25 NOTE — PLAN OF CARE
Problem: BIRTH - VAGINAL/ SECTION  Goal: Fetal and maternal status remain reassuring during the birth process  Description: INTERVENTIONS:- Monitor vital signs- Monitor fetal heart rate- Monitor uterine activity- Monitor labor progression (vaginal delivery)- DVT prophylaxis (C/S delivery)- Surgical antibiotic prophylaxis (C/S delivery)  Outcome: Progressing     Problem: PAIN - ADULT  Goal: Verbalizes/displays adequate comfort level or patient's stated pain goal  Description: INTERVENTIONS:- Encourage pt to monitor pain and request assistance- Assess pain using appropriate pain scale- Administer analgesics based on type and severity of pain and evaluate response- Implement non-pharmacological measures as appropriate and evaluate response- Consider cultural and social influences on pain and pain management- Manage/alleviate anxiety- Utilize distraction and/or relaxation techniques- Monitor for opioid side effects- Notify MD/LIP if interventions unsuccessful or patient reports new pain- Anticipate increased pain with activity and pre-medicate as appropriate  Outcome: Progressing     Problem: ANXIETY  Goal: Will report anxiety at manageable levels  Description: INTERVENTIONS:- Administer medication as ordered- Teach and rehearse alternative coping skills- Provide emotional support with 1:1 interaction with staff  Outcome: Progressing     Problem: Patient Centered Care  Goal: Patient preferences are identified and integrated in the patient's plan of care  Description: Interventions:- What would you like us to know as we care for you? Have a safe delivery- Provide timely, complete, and accurate information to patient/family- Incorporate patient and family knowledge, values, beliefs, and cultural backgrounds into the planning and delivery of care- Encourage patient/family to participate in care and decision-making at the level they choose- Honor patient and family perspectives and choices  Outcome: Progressing      Problem: Patient/Family Goals  Goal: Patient/Family Long Term Goal  Description: Patient's Long Term Goal: Uncomplicated Delivery     Interventions:  - Assessment/Monitoring  - Induction/Augmentation per protocol and Provider order  - C/S per protocol and Provider order   - Education  - Intervention per protocol and Provider order with education   - Involve patient in POC  - See additional Care Plan goals for specific interventions    Outcome: Progressing  Goal: Patient/Family Short Term Goal  Description: Patient's Short Term Goal: Comfort and Pain Control     Interventions:   - Non Pharmacological pain intervention   - IV/IM and Epidural pain medication per Provider order and patient request  - Education  - Involve Patient in POC   - See additional Care Plan goals for specific interventions      Outcome: Progressing     Problem: POSTPARTUM  Goal: Long Term Goal:Experiences normal postpartum course  Description: INTERVENTIONS:- Assess and monitor vital signs and lab values.- Assess fundus and lochia.- Provide ice/sitz baths for perineum discomfort.- Monitor healing of incision/episiotomy/laceration, and assess for signs and symptoms of infection and hematoma.- Assess bladder function and monitor for bladder distention.- Provide/instruct/assist with pericare as needed.- Provide VTE prophylaxis as needed.- Monitor bowel function.- Encourage ambulation and provide assistance as needed.- Assess and monitor emotional status and provide social service/psych resources as needed.- Utilize standard precautions and use personal protective equipment as indicated. Ensure aseptic care of all intravenous lines and invasive tubes/drains.- Obtain immunization and exposure to communicable diseases history.  Outcome: Progressing  Goal: Optimize infant feeding at the breast  Description: INTERVENTIONS:- Initiate breast feeding within first hour after birth. - Monitor effectiveness of current breast feeding efforts.- Assess support  systems available to mother/family.- Identify cultural beliefs/practices regarding lactation, letdown techniques, maternal food preferences.- Assess mother's knowledge and previous experience with breast feeding.- Provide information as needed about early infant feeding cues (e.g., rooting, lip smacking, sucking fingers/hand) versus late cue of crying.- Discuss/demonstrate breast feeding aids (e.g., infant sling, nursing footstool/pillows, and breast pumps).- Encourage mother/other family members to express feelings/concerns, and actively listen.- Educate father/SO about benefits of breast feeding and how to manage common lactation challenges.- Recommend avoidance of specific medications or substances incompatible with breast feeding.- Assess and monitor for signs of nipple pain/trauma.- Instruct and provide assistance with proper latch.- Review techniques for milk expression (breast pumping) and storage of breast milk. Provide pumping equipment/supplies, instructions and assistance, as needed.- Encourage rooming-in and breast feeding on demand.- Encourage skin-to-skin contact.- Provide LC support as needed.- Assess for and manage engorgement.- Provide breast feeding education handouts and information on community breast feeding support.   Outcome: Progressing  Goal: Establishment of adequate milk supply with medication/procedure interruptions  Description: INTERVENTIONS:- Review techniques for milk expression (breast pumping). - Provide pumping equipment/supplies, instructions, and assistance until it is safe to breastfeed infant.  Outcome: Progressing  Goal: Appropriate maternal -  bonding  Description: INTERVENTIONS:- Assess caregiver- interactions.- Assess caregiver's emotional status and coping mechanisms.- Encourage caregiver to participate in  daily care.- Assess support systems available to mother/family.- Provide /case management support as needed.  Outcome: Progressing

## 2025-03-25 NOTE — PLAN OF CARE
Problem: BIRTH - VAGINAL/ SECTION  Goal: Fetal and maternal status remain reassuring during the birth process  Description: INTERVENTIONS:- Monitor vital signs- Monitor fetal heart rate- Monitor uterine activity- Monitor labor progression (vaginal delivery)- DVT prophylaxis (C/S delivery)- Surgical antibiotic prophylaxis (C/S delivery)  Outcome: Progressing     Problem: PAIN - ADULT  Goal: Verbalizes/displays adequate comfort level or patient's stated pain goal  Description: INTERVENTIONS:- Encourage pt to monitor pain and request assistance- Assess pain using appropriate pain scale- Administer analgesics based on type and severity of pain and evaluate response- Implement non-pharmacological measures as appropriate and evaluate response- Consider cultural and social influences on pain and pain management- Manage/alleviate anxiety- Utilize distraction and/or relaxation techniques- Monitor for opioid side effects- Notify MD/LIP if interventions unsuccessful or patient reports new pain- Anticipate increased pain with activity and pre-medicate as appropriate  Outcome: Progressing     Problem: ANXIETY  Goal: Will report anxiety at manageable levels  Description: INTERVENTIONS:- Administer medication as ordered- Teach and rehearse alternative coping skills- Provide emotional support with 1:1 interaction with staff  Outcome: Progressing     Problem: Patient/Family Goals  Goal: Patient/Family Long Term Goal  Description: Patient's Long Term Goal: Uncomplicated Delivery     Interventions:  - Assessment/Monitoring  - Induction/Augmentation per protocol and Provider order  - C/S per protocol and Provider order   - Education  - Intervention per protocol and Provider order with education   - Involve patient in POC  - See additional Care Plan goals for specific interventions    Outcome: Progressing  Goal: Patient/Family Short Term Goal  Description: Patient's Short Term Goal: Comfort and Pain Control     Interventions:    - Non Pharmacological pain intervention   - IV/IM and Epidural pain medication per Provider order and patient request  - Education  - Involve Patient in POC   - See additional Care Plan goals for specific interventions      Outcome: Progressing     Problem: Patient/Family Goals  Goal: Patient/Family Short Term Goal  Description: Patient's Short Term Goal: Comfort and Pain Control     Interventions:   - Non Pharmacological pain intervention   - IV/IM and Epidural pain medication per Provider order and patient request  - Education  - Involve Patient in POC   - See additional Care Plan goals for specific interventions      Outcome: Progressing

## 2025-03-26 LAB
BASOPHILS # BLD AUTO: 0.07 X10(3) UL (ref 0–0.2)
BASOPHILS NFR BLD AUTO: 0.6 %
DEPRECATED RDW RBC AUTO: 45 FL (ref 35.1–46.3)
EOSINOPHIL # BLD AUTO: 0.26 X10(3) UL (ref 0–0.7)
EOSINOPHIL NFR BLD AUTO: 2.4 %
ERYTHROCYTE [DISTWIDTH] IN BLOOD BY AUTOMATED COUNT: 13.5 % (ref 11–15)
HCT VFR BLD AUTO: 29.9 %
HGB BLD-MCNC: 10 G/DL
IMM GRANULOCYTES # BLD AUTO: 0.05 X10(3) UL (ref 0–1)
IMM GRANULOCYTES NFR BLD: 0.5 %
LYMPHOCYTES # BLD AUTO: 2.76 X10(3) UL (ref 1–4)
LYMPHOCYTES NFR BLD AUTO: 25.6 %
MCH RBC QN AUTO: 30.2 PG (ref 26–34)
MCHC RBC AUTO-ENTMCNC: 33.4 G/DL (ref 31–37)
MCV RBC AUTO: 90.3 FL
MONOCYTES # BLD AUTO: 0.75 X10(3) UL (ref 0.1–1)
MONOCYTES NFR BLD AUTO: 7 %
NEUTROPHILS # BLD AUTO: 6.9 X10 (3) UL (ref 1.5–7.7)
NEUTROPHILS # BLD AUTO: 6.9 X10(3) UL (ref 1.5–7.7)
NEUTROPHILS NFR BLD AUTO: 63.9 %
PLATELET # BLD AUTO: 138 10(3)UL (ref 150–450)
PLATELETS.RETICULATED NFR BLD AUTO: 27.5 % (ref 0–7)
RBC # BLD AUTO: 3.31 X10(6)UL
WBC # BLD AUTO: 10.8 X10(3) UL (ref 4–11)

## 2025-03-26 NOTE — LACTATION NOTE
03/26/25 0750   Evaluation Type   Evaluation Type Inpatient   Problems identified   Problems identified Knowledge deficit   Problems Identified Other frequent short feedings last night   Maternal history   Maternal history AMA   Breastfeeding goal   Breastfeeding goal To maintain breast milk feeding per patient goal   Maternal Assessment   Bilateral Breasts Filling;Symmetrical;Pendulous   Bilateral Nipples Slightly everted/short;Erythema;Sore;Compression stripe;Colostrum easily expressed   Right Areola Edema   Prior breastfeeding experience (comment below) Multip;Pumped & bottle fed   Breastfeeding Assistance Breastfeeding assistance provided with permission;Breast exam provided with permission;Pumping assistance provided with permission   Pain assessment   Pain, additional Pain location;Pain w/initial sucks only   Pain Location Nipples   Location/Comment 2/10   Treatment of Sore Nipples Deeper latch techniques;Expressed breast milk;Hydrogel dressings as directed;Lanolin   Guidelines for use of:   Equipment Lanolin   Current use of pump: Not currently indicated   Other (comment) Demonstrated deep latch techniques and support, infant sustained deep latch with active suck/swallow patterns identified in CC hold, both breasts offered. Follow up lactation visit scheduled with Dr. Helms on Friday post hospital discharge. Reviewed continued lactation support via warm line and IP as needed.

## 2025-03-26 NOTE — LACTATION NOTE
03/25/25 1800   Evaluation Type   Evaluation Type Inpatient   Problems identified   Problems identified Knowledge deficit   Problems Identified Other reports concerns with latch difficulty on left side   Maternal history   Maternal history AMA   Breastfeeding goal   Breastfeeding goal To maintain breast milk feeding per patient goal   Maternal Assessment   Bilateral Breasts Soft;Symmetrical;Pendulous   Bilateral Nipples Elastic;Slightly everted/short;Colostrum easily expressed   Right Nipple Bruised   Prior breastfeeding experience (comment below) Multip;Problems, continued;Pumped & bottle fed   Breastfeeding Assistance LC assistance declined at this time   Pain assessment   Pain, additional Pain location   Pain Location Nipples   Treatment of Sore Nipples Deeper latch techniques;Expressed breast milk   Guidelines for use of:   Current use of pump: Not currently indicated   Other (comment) Reviewed positioning and deep latch techniques, assisted with creating favorable nipple/areola tissue for deeper latch. Infant recently fed, follow up planned for tomorrow morning. Pt has a lactation visit scheduled at home later this week.

## 2025-03-26 NOTE — LACTATION NOTE
This note was copied from a baby's chart.     03/26/25 4103   Evaluation Type   Evaluation Type Inpatient   Problems & Assessment   Problems Diagnosed or Identified Latch difficulty;Shallow latch   Problems: comment/detail shallow latch improved with support and deep latch techniques   Muscle tone Appropriate for GA   Feeding Assessment   Summary Current Feeding Breastfeeding exclusively   Breastfeeding Assessment Assisted with breastfeeding w/mother's permission;Deep latch achieved and observed;Calm and ready to breastfeed;Coordinated suck/swallow;Sustained nutrititive latch w/audible swallows;Tolerated feeding well   Breastfeeding lasted # of minutes 20   Breastfeeding Positions cross cradle;right breast;left breast   Latch 2   Audible Sucks/Swallows 2   Type of Nipple 2   Comfort (Breast/Nipple) 1   Hold (Positioning) 1   LATCH Score 8   Other (comment) Demonstrated gentle waking and breast compression when indicated

## 2025-03-26 NOTE — CM/SW NOTE
Sw received MDO for EDPS.  SW met with patient bedside.  SW confirmed face sheet contact as correct.    Baby girl name: Radha  Date & time of delivery:   3/25/25 00:54:57     Delivery method:   Siblings age: 2 y.o.    Patient employed: Yes  Length of maternity leave: 4 months    Father of baby employed: Yes  Length of paternity leave: 2 weeks    Breast or formula feed: Breast but ok with both    Pediatrician: Dr. Wright    Infant Insurance: [employer]   Change HC contacted: NA    Mental Health History: Hx Postpartum with first pregnancy    Medications: Aniket    Therapist: Michelle Khalil    Psychiatrist: Aniket HENDRICKSON discussed signs, symptoms and risks associated with post partum depression & anxiety.  SW provided pt with PMAD resources.  Other resources provided: mom line, mood boost, SDOH Lauri, Anxiety Depression informational, Support group flyer    Patient support system: FOB, family, friends    Patient denied current questions/needs from [facility].    SW to remain available for support and/or discharge planning.    Shama Hough MSW, LSW   [id number]

## 2025-03-26 NOTE — PROGRESS NOTES
[facility]  Obstetrics and Gynecology    OB/GYN: Postpartum Progress Note     SUBJECTIVE:  Patient is a 39 year old  female who is s/p . She is PPD# 1.   Doing well. Noted no complaints. Denies fever, chills, N, V, chest pain and SOB. Bleeding has been stable. Voiding without difficulty. Ambulating well.    OBJECTIVE:  Vitals:    25 0920 25 1335 25 0910   BP: 121/89 123/76 126/76 114/78   Pulse: 62 72 69    Resp: 14 20 16 16   Temp: 97.7 °F (36.5 °C) 98 °F (36.7 °C) 98.8 °F (37.1 °C) 98.2 °F (36.8 °C)   TempSrc: Oral Oral Oral Oral   SpO2:       Weight:       Height:           Physical Exam:    General:    alert, appears stated age, and cooperative   Lochia:  appropriate   Uterine Fundus:   firm below umbilicus   Perineum:  healing well, no significant drainage, no dehiscence, no significant erythema    DVT Evaluation:  No evidence of DVT seen on physical exam.     Urinary output is adequate.     Labs:  Recent Labs   Lab 25  0600   RBC 3.31*   HGB 10.0*   HCT 29.9*   MCV 90.3   MCH 30.2   MCHC 33.4   RDW 13.5   NEPRELIM 6.90   WBC 10.8   .0*       ASSESSMENT/PLAN:  Patient is a 39 year old  female who is s/p  PPD# 1.     Doing well   Continue routine postpartum care  Vitals per routine   Encourage ambulation       DO AYANNA Hassan OBGYN

## 2025-03-27 ENCOUNTER — TELEPHONE (OUTPATIENT)
Dept: OBGYN CLINIC | Facility: CLINIC | Age: 40
End: 2025-03-27

## 2025-03-27 VITALS
TEMPERATURE: 98 F | WEIGHT: 174.19 LBS | DIASTOLIC BLOOD PRESSURE: 75 MMHG | HEIGHT: 66 IN | RESPIRATION RATE: 16 BRPM | OXYGEN SATURATION: 98 % | BODY MASS INDEX: 27.99 KG/M2 | SYSTOLIC BLOOD PRESSURE: 120 MMHG | HEART RATE: 85 BPM

## 2025-03-27 RX ORDER — DOCUSATE SODIUM 100 MG/1
100 CAPSULE, LIQUID FILLED ORAL 2 TIMES DAILY PRN
Qty: 60 CAPSULE | Refills: 0 | Status: SHIPPED | OUTPATIENT
Start: 2025-03-27 | End: 2025-04-26

## 2025-03-27 RX ORDER — ACETAMINOPHEN 325 MG/1
325 TABLET ORAL EVERY 6 HOURS PRN
Qty: 60 TABLET | Refills: 0 | Status: SHIPPED | OUTPATIENT
Start: 2025-03-27

## 2025-03-27 RX ORDER — POLYETHYLENE GLYCOL 3350 17 G/17G
17 POWDER, FOR SOLUTION ORAL 2 TIMES DAILY PRN
Qty: 60 EACH | Refills: 0 | Status: SHIPPED | OUTPATIENT
Start: 2025-03-27 | End: 2025-04-26

## 2025-03-27 RX ORDER — IBUPROFEN 600 MG/1
600 TABLET, FILM COATED ORAL EVERY 6 HOURS PRN
Qty: 60 TABLET | Refills: 0 | Status: SHIPPED | OUTPATIENT
Start: 2025-03-27

## 2025-03-27 NOTE — PLAN OF CARE
Problem: PAIN - ADULT  Goal: Verbalizes/displays adequate comfort level or patient's stated pain goal  Description: INTERVENTIONS:- Encourage pt to monitor pain and request assistance- Assess pain using appropriate pain scale- Administer analgesics based on type and severity of pain and evaluate response- Implement non-pharmacological measures as appropriate and evaluate response- Consider cultural and social influences on pain and pain management- Manage/alleviate anxiety- Utilize distraction and/or relaxation techniques- Monitor for opioid side effects- Notify MD/LIP if interventions unsuccessful or patient reports new pain- Anticipate increased pain with activity and pre-medicate as appropriate  Outcome: Progressing     Problem: POSTPARTUM  Goal: Long Term Goal:Experiences normal postpartum course  Description: INTERVENTIONS:- Assess and monitor vital signs and lab values.- Assess fundus and lochia.- Provide ice/sitz baths for perineum discomfort.- Monitor healing of incision/episiotomy/laceration, and assess for signs and symptoms of infection and hematoma.- Assess bladder function and monitor for bladder distention.- Provide/instruct/assist with pericare as needed.- Provide VTE prophylaxis as needed.- Monitor bowel function.- Encourage ambulation and provide assistance as needed.- Assess and monitor emotional status and provide social service/psych resources as needed.- Utilize standard precautions and use personal protective equipment as indicated. Ensure aseptic care of all intravenous lines and invasive tubes/drains.- Obtain immunization and exposure to communicable diseases history.  INTERVENTIONS:- Assess and monitor vital signs and lab values.- Assess fundus and lochia.- Provide ice/sitz baths for perineum discomfort.- Monitor healing of incision/episiotomy/laceration, and assess for signs and symptoms of infection and hematoma.- Assess bladder function and monitor for bladder distention.-  Provide/instruct/assist with pericare as needed.- Provide VTE prophylaxis as needed.- Monitor bowel function.- Encourage ambulation and provide assistance as needed.- Assess and monitor emotional status and provide social service/psych resources as needed.- Utilize standard precautions and use personal protective equipment as indicated. Ensure aseptic care of all intravenous lines and invasive tubes/drains.- Obtain immunization and exposure to communicable diseases history.  Outcome: Progressing  Goal: Optimize infant feeding at the breast  Description: INTERVENTIONS:- Initiate breast feeding within first hour after birth. - Monitor effectiveness of current breast feeding efforts.- Assess support systems available to mother/family.- Identify cultural beliefs/practices regarding lactation, letdown techniques, maternal food preferences.- Assess mother's knowledge and previous experience with breast feeding.- Provide information as needed about early infant feeding cues (e.g., rooting, lip smacking, sucking fingers/hand) versus late cue of crying.- Discuss/demonstrate breast feeding aids (e.g., infant sling, nursing footstool/pillows, and breast pumps).- Encourage mother/other family members to express feelings/concerns, and actively listen.- Educate father/SO about benefits of breast feeding and how to manage common lactation challenges.- Recommend avoidance of specific medications or substances incompatible with breast feeding.- Assess and monitor for signs of nipple pain/trauma.- Instruct and provide assistance with proper latch.- Review techniques for milk expression (breast pumping) and storage of breast milk. Provide pumping equipment/supplies, instructions and assistance, as needed.- Encourage rooming-in and breast feeding on demand.- Encourage skin-to-skin contact.- Provide LC support as needed.- Assess for and manage engorgement.- Provide breast feeding education handouts and information on community breast  feeding support.   INTERVENTIONS:- Initiate breast feeding within first hour after birth. - Monitor effectiveness of current breast feeding efforts.- Assess support systems available to mother/family.- Identify cultural beliefs/practices regarding lactation, letdown techniques, maternal food preferences.- Assess mother's knowledge and previous experience with breast feeding.- Provide information as needed about early infant feeding cues (e.g., rooting, lip smacking, sucking fingers/hand) versus late cue of crying.- Discuss/demonstrate breast feeding aids (e.g., infant sling, nursing footstool/pillows, and breast pumps).- Encourage mother/other family members to express feelings/concerns, and actively listen.- Educate father/SO about benefits of breast feeding and how to manage common lactation challenges.- Recommend avoidance of specific medications or substances incompatible with breast feeding.- Assess and monitor for signs of nipple pain/trauma.- Instruct and provide assistance with proper latch.- Review techniques for milk expression (breast pumping) and storage of breast milk. Provide pumping equipment/supplies, instructions and assistance, as needed.- Encourage rooming-in and breast feeding on demand.- Encourage skin-to-skin contact.- Provide LC support as needed.- Assess for and manage engorgement.- Provide breast feeding education handouts and information on community breast feeding support.   Outcome: Progressing  Goal: Establishment of adequate milk supply with medication/procedure interruptions  Description: INTERVENTIONS:- Review techniques for milk expression (breast pumping). - Provide pumping equipment/supplies, instructions, and assistance until it is safe to breastfeed infant.  INTERVENTIONS:- Review techniques for milk expression (breast pumping). - Provide pumping equipment/supplies, instructions, and assistance until it is safe to breastfeed infant.  Outcome: Progressing  Goal: Appropriate  maternal -  bonding  Description: INTERVENTIONS:- Assess caregiver- interactions.- Assess caregiver's emotional status and coping mechanisms.- Encourage caregiver to participate in  daily care.- Assess support systems available to mother/family.- Provide /case management support as needed.  INTERVENTIONS:- Assess caregiver- interactions.- Assess caregiver's emotional status and coping mechanisms.- Encourage caregiver to participate in  daily care.- Assess support systems available to mother/family.- Provide /case management support as needed.  Outcome: Progressing  Goal: Experiences normal breast weaning course  Description: INTERVENTIONS:- Assess for and manage engorgement.- Instruct on breast care.- Provide comfort measures.  Outcome: Progressing

## 2025-03-27 NOTE — PROGRESS NOTES
[facility]  Obstetrics and Gynecology    OB/GYN: Postpartum Progress Note     SUBJECTIVE:  Patient is a 39 year old  female who is s/p . She is PPD# 2.   Doing well. Noted no complaints and she desires discharge home today. Denies fever, chills, N, V, chest pain and SOB. Bleeding has been stable. Voiding without difficulty.  Passing flatus.  No Bm. Tolerating general diet. Ambulating without difficulty.     OBJECTIVE:  Vitals:    25 0910 25 2132 25 0951   BP: 126/76 114/78 121/75 120/75   Pulse: 69  74 85   Resp: 16 16 18 16   Temp: 98.8 °F (37.1 °C) 98.2 °F (36.8 °C) 97.9 °F (36.6 °C) 98.1 °F (36.7 °C)   TempSrc: Oral Oral Oral Oral   SpO2:  98%     Weight:       Height:           Physical Exam:    General:    alert, appears stated age, and cooperative   Lochia:  appropriate   Uterine Fundus:   firm at the umbilicus   Perineum:  healing well    DVT Evaluation:  No evidence of DVT seen on physical exam.     Urinary output is adequate. (When recorded)    Labs:  Recent Labs   Lab 25  0600   RBC 3.31*   HGB 10.0*   HCT 29.9*   MCV 90.3   MCH 30.2   MCHC 33.4   RDW 13.5   NEPRELIM 6.90   WBC 10.8   .0*       ASSESSMENT/PLAN:  Patient is a 39 year old  female who is s/p  PPD# 2.     Doing well   Continue routine postpartum care  Vitals per routine   Encourage ambulation   Plan for discharge to home today.   Follow up in 6 weeks      Dr. Craig Molina MD    [facility]     This note was created by [employer] voice recognition. Errors in content may be related to improper recognition by the system; efforts to review and correct have been done but errors may still exist. Please be advised the primary purpose of this note is for me to communicate medical care. Standard sentence structure is not always used. Medical terminology and medical abbreviations may be used. There may be grammatical, typographical, and automated fill ins that may have  errors missed in proofreading.

## 2025-03-27 NOTE — PLAN OF CARE
Problem: POSTPARTUM  Goal: Long Term Goal:Experiences normal postpartum course  Description: INTERVENTIONS:- Assess and monitor vital signs and lab values.- Assess fundus and lochia.- Provide ice/sitz baths for perineum discomfort.- Monitor healing of incision/episiotomy/laceration, and assess for signs and symptoms of infection and hematoma.- Assess bladder function and monitor for bladder distention.- Provide/instruct/assist with pericare as needed.- Provide VTE prophylaxis as needed.- Monitor bowel function.- Encourage ambulation and provide assistance as needed.- Assess and monitor emotional status and provide social service/psych resources as needed.- Utilize standard precautions and use personal protective equipment as indicated. Ensure aseptic care of all intravenous lines and invasive tubes/drains.- Obtain immunization and exposure to communicable diseases history.  INTERVENTIONS:- Assess and monitor vital signs and lab values.- Assess fundus and lochia.- Provide ice/sitz baths for perineum discomfort.- Monitor healing of incision/episiotomy/laceration, and assess for signs and symptoms of infection and hematoma.- Assess bladder function and monitor for bladder distention.- Provide/instruct/assist with pericare as needed.- Provide VTE prophylaxis as needed.- Monitor bowel function.- Encourage ambulation and provide assistance as needed.- Assess and monitor emotional status and provide social service/psych resources as needed.- Utilize standard precautions and use personal protective equipment as indicated. Ensure aseptic care of all intravenous lines and invasive tubes/drains.- Obtain immunization and exposure to communicable diseases history.  Outcome: Completed  Goal: Optimize infant feeding at the breast  Description: INTERVENTIONS:- Initiate breast feeding within first hour after birth. - Monitor effectiveness of current breast feeding efforts.- Assess support systems available to mother/family.-  Identify cultural beliefs/practices regarding lactation, letdown techniques, maternal food preferences.- Assess mother's knowledge and previous experience with breast feeding.- Provide information as needed about early infant feeding cues (e.g., rooting, lip smacking, sucking fingers/hand) versus late cue of crying.- Discuss/demonstrate breast feeding aids (e.g., infant sling, nursing footstool/pillows, and breast pumps).- Encourage mother/other family members to express feelings/concerns, and actively listen.- Educate father/SO about benefits of breast feeding and how to manage common lactation challenges.- Recommend avoidance of specific medications or substances incompatible with breast feeding.- Assess and monitor for signs of nipple pain/trauma.- Instruct and provide assistance with proper latch.- Review techniques for milk expression (breast pumping) and storage of breast milk. Provide pumping equipment/supplies, instructions and assistance, as needed.- Encourage rooming-in and breast feeding on demand.- Encourage skin-to-skin contact.- Provide LC support as needed.- Assess for and manage engorgement.- Provide breast feeding education handouts and information on community breast feeding support.   INTERVENTIONS:- Initiate breast feeding within first hour after birth. - Monitor effectiveness of current breast feeding efforts.- Assess support systems available to mother/family.- Identify cultural beliefs/practices regarding lactation, letdown techniques, maternal food preferences.- Assess mother's knowledge and previous experience with breast feeding.- Provide information as needed about early infant feeding cues (e.g., rooting, lip smacking, sucking fingers/hand) versus late cue of crying.- Discuss/demonstrate breast feeding aids (e.g., infant sling, nursing footstool/pillows, and breast pumps).- Encourage mother/other family members to express feelings/concerns, and actively listen.- Educate father/SO about  benefits of breast feeding and how to manage common lactation challenges.- Recommend avoidance of specific medications or substances incompatible with breast feeding.- Assess and monitor for signs of nipple pain/trauma.- Instruct and provide assistance with proper latch.- Review techniques for milk expression (breast pumping) and storage of breast milk. Provide pumping equipment/supplies, instructions and assistance, as needed.- Encourage rooming-in and breast feeding on demand.- Encourage skin-to-skin contact.- Provide LC support as needed.- Assess for and manage engorgement.- Provide breast feeding education handouts and information on community breast feeding support.   Outcome: Completed  Goal: Establishment of adequate milk supply with medication/procedure interruptions  Description: INTERVENTIONS:- Review techniques for milk expression (breast pumping). - Provide pumping equipment/supplies, instructions, and assistance until it is safe to breastfeed infant.  INTERVENTIONS:- Review techniques for milk expression (breast pumping). - Provide pumping equipment/supplies, instructions, and assistance until it is safe to breastfeed infant.  Outcome: Completed  Goal: Appropriate maternal -  bonding  Description: INTERVENTIONS:- Assess caregiver- interactions.- Assess caregiver's emotional status and coping mechanisms.- Encourage caregiver to participate in  daily care.- Assess support systems available to mother/family.- Provide /case management support as needed.  INTERVENTIONS:- Assess caregiver- interactions.- Assess caregiver's emotional status and coping mechanisms.- Encourage caregiver to participate in  daily care.- Assess support systems available to mother/family.- Provide /case management support as needed.  Outcome: Completed  Goal: Experiences normal breast weaning course  Description: INTERVENTIONS:- Assess for and manage engorgement.-  Instruct on breast care.- Provide comfort measures.  Outcome: Completed

## 2025-03-27 NOTE — DISCHARGE SUMMARY
[facility]  part of [facility]    Discharge Summary    Nedra Jacobo Patient Status:  Inpatient    1985 MRN [id number]   Location [facility] 3SE Attending Daina Stubbs MD   Hosp Day # 3 PCP Tosha Liu DO     Date of Admission: 3/24/2025    Date of Discharge: 25    Admission Diagnoses: pregnant  Pregnancy (HCC) at 38w5d     Secondary Diagnosis:   Patient Active Problem List   Diagnosis    Previous  delivery affecting pregnancy, delivered ([facility])    Antepartum multigravida of advanced maternal age ([facility])    Supervision of other normal pregnancy, antepartum ([facility])    Pregnancy ([facility])     (vaginal birth after ) ([facility])    Rh negative, antepartum ([facility])          Primary OB Clinician: [facility]    [facility] Course:     EDC: Estimated Date of Delivery: 4/3/25    Gestational Age: 38w5d    Date of Delivery: 3/25/25    Antepartum complications: none    Delivered By: Dr. Daina Stubbs  Delivery Type: Vaginal Birth After  Section ()     Tubal Ligation: n/a    Baby: Liveborn female,     Apgars:  1 minute:   8                 5 minutes: 9                             Anesthesia: epidural          Intrapartum Complications: None    Laceration: 2nd degree    Episiotomy: none    Placenta: spontaneous    Feeding Method: breast fed    Rh Immune Globulin Given: no    Rubella Vaccine Given: no        Discharge Plan:   Discharge Condition: Good  Early Discharge:  NO    Discharge medications:  Current Discharge Medication List        New Orders    Details   ibuprofen 600 MG Oral Tab Take 1 tablet (600 mg total) by mouth every 6 (six) hours as needed for Pain.      acetaminophen 325 MG Oral Tab Take 1 tablet (325 mg total) by mouth every 6 (six) hours as needed for Pain.      docusate sodium 100 MG Oral Cap Take 1 capsule (100 mg total) by mouth 2 (two) times daily as needed.      polyethylene glycol, PEG 3350, 17 g Oral Powd Pack Take 17 g by mouth 2 (two)  times daily as needed.           Home Meds - Unchanged    Details   magnesium 250 MG Oral Tab Take 1 tablet (250 mg total) by mouth.      Ferrous Gluconate (IRON 27 OR) Take by mouth.      prenatal vitamin with DHA 27-0.8-228 MG Oral Cap Take 1 capsule by mouth daily.      Cholecalciferol (VITAMIN D) 50 MCG (2000 UT) Oral Cap Take by mouth.      metoclopramide (REGLAN) 10 MG Oral Tab Take 1 tablet (10 mg total) by mouth every 6 (six) hours as needed.      fluticasone propionate 50 MCG/ACT Nasal Suspension 2 sprays by Each Nare route daily.                   Discharge Diet: As tolerated and General diet    Discharge Activity: Pelvic rest until cleared    Follow up:      Follow-up Information       [facility] Follow up.    Specialty: Pediatrics  Contact information:  [address] [facility] [id number] [phone]  Additional information:  Masks are optional for all patients and visitors, unless otherwise indicated.             Daina Stubbs MD. Go on 5/7/2025.    Specialty: OBSTETRICS & GYNECOLOGY  Why: Routine postpartum visit  Contact information:  [address] [place] [address] [id number] [phone]                             Follow up Labs: None      Other Discharge Instructions:           Post Vaginal Delivery Home Care Instructions     We hope you were pleased with your care at [facility].  We wish you the best outcome and overall experience with the delivery of your baby.  These instructions will help to minimize pain, limit the risk for an infection, and improve the likelihood of a successful recovery.    What to Expect:  Abdominal cramping after delivery especially if you are breastfeeding.   Vaginal bleeding for about 4-6 weeks that may be followed by a yellow or white discharge for a few more weeks.  Your period will resume in approximately 6-8 weeks, unless you are breastfeeding.    If you are bottle feeding, you may notice breast engorgement in  about 3 days.  Your breast may be sore and hard. Please wear a tight fitted bra or sports bra for 24-36 hours to help prevent your breast from producing milk, and use ice packs to relive any discomfort.  If you are breastfeeding, nipple dryness is very common the first few days.    Constipation is common after having a baby.  Please increase fluid and fiber in your diet.      Over-The-Counter Medication  Non-prescription anti-inflammatory medications can also help to ease the pain.  You may take Tylenol or Ibuprofen   Colace or Metamucil for Constipation  Lanolin for dry nipples  Tucks, Witch Hazel and Epifoam for vaginal/perineum discomfort.   Drink a full glass of water with oral medication and take as directed.    Wound Care  The following instructions will promote proper healing and help to prevent infection  Vaginal/perineum Care: Sitz Bath for 15mins, 2-3 times a day,    Bathing/Showers  You may resume showers  No baths, swimming, hot tubs until your post-partum visit    Home Medication  Resume your home medications as instructed    Diet  Resume your normal diet    Activity  Refrain from vaginal intercourse, vaginal suppositories, tampon use or douches until after your post-partum visit.  No exercising for 4 weeks  You may climb stairs minimally for the 1st week.    Do not do heavy housework for at least 2-3 weeks    Return to Work or School  You may return to work in approximately 6 weeks  Contact your obstetrician’s office, if you need a medical release. ([phone])    Follow-up Appointment with Your Obstetrician  Call your obstetrician’s office today for an appointment in 4-6 weeks.    The number is [phone].  Verify your appointment date, day, time, and location.  At your 1st post-partum office visit:  Your progress will be evaluated, findings reviewed, and any additional concerns and instructions will be discussed.    Questions or Concerns  Call your obstetrician’s office if you experience the  following:  Severe pain not controlled by pain medication  Foul smelling vaginal discharge  Heavy bleeding  Shortness of breath  Fever  Crying and periods of sadness that prevents you from caring for yourself and your baby  Burning sensation during urination or inability to urinate  Swelling, redness or abnormal warmth to your leg/calf  Please call ([phone]). If your call is made after office hours, a physician will be available to help you.  There is always a provider covering our patients.    Thank you for coming to [facility] to start your new family.  The nurses and the anesthesiologists try very hard to make sure you receive the best care possible.  Your trust in them as well as us is greatly appreciated.    Thanks so much,   The Providers of [facility]      BREAST ENGORGEMENT MANAGEMENT    Prior to handling baby, your breasts, or breast pump, remember to wash hands with soap and water.    Prevent and treat engorgement; Increase milk let downs prior to breastfeeding or pumping:   Snuggle your baby in skin to skin contact between and during feedings whenever possible.  Before nursing or pumping:     Gently massage breasts while reclined or lying flat with unrefined coconut oil or olive oil to reduce friction and increase desired response.  Massage toward armpit to allow drainage of excess fluid collecting in breast tissue causing swelling and resistance to milk flow.   While reclined, perform breast gymnastics on each breast individually, place breast between both hands and move side to side, up and down, and around in circular motion to allow a clear pathway for drainage of unnecessary fluid.  If areolar tissue remain dense, tight, or full apply gentle reverse compression on areola to soften the area to aid in latching/pumping/emptying: place thumbs on each side of nipple, gentle push in toward chest wall for a count of 3, rotate around nipple until tissue becomes more  elastic and less dense at the surface.     Breastfeed on demand, wake baby by 1 ½ to 3   hours to feed if sleepy.     Deep Latching on:  Try different positions, with baby's chin pointed toward the plugged area if possible  Express drops of milk onto your baby's lips to encourage licking.  Point your nipple to baby's nose  Stroke nipple lightly down center of lips  Wait for wide mouth with tongue cupped at bottom of mouth.  Infant's chin should be deep into breast, with some room between nose and the breast.     Breast compression: as needed during nursing to increase infant swallowing and soften firm areas. This can be done while pumping as well.    Pump to soften the breast if still uncomfortably full after nursing or baby is uninterested in nursing.    Apply cold compresses for 10 minutes on your breast if needed to decrease breast swelling after nursing or pumping:     Rest and drink plenty of fluids.    Check with your doctor about taking ibuprofen for relief of swelling and discomfort.    Call your OB doctor with any signs of   mastitis (breast infection)  Plugged area(s) are not soft within 24 hours.   Breast becomes firm, reddened, or painful.   Fever, chills, or flu-like symptoms. Check your temperature 3 times daily until a plugged duct or mastitis resolves.    If mastitis occurs:  Continue breastfeeding and/or pumping - your milk is not infected.   Continue above treatment for relieving plugged area(s)  Continue to take antibiotic as prescribed even though you may quickly feel better.   Contact your doctor if you are not feeling significantly better within 1-2 days of starting antibiotic, sooner if symptoms worsen.    Call lactation consultant [phone] for guidance as needed.     Schedule an outpatient lactation appointment for evaluation if engorgement, plugged duct or mastitis reoccurs.    For additional information:   La Leche [employer] website www.llli.org             Dr. Craig Molina MD    [facility]  [facility]     This note was created by Dragon voice recognition. Errors in content may be related to improper recognition by the system; efforts to review and correct have been done but errors may still exist. Please be advised the primary purpose of this note is for me to communicate medical care. Standard sentence structure is not always used. Medical terminology and medical abbreviations may be used. There may be grammatical, typographical, and automated fill ins that may have errors missed in proofreading.

## 2025-03-28 ENCOUNTER — OFFICE VISIT (OUTPATIENT)
Dept: OBGYN CLINIC | Facility: CLINIC | Age: 40
End: 2025-03-28
Payer: COMMERCIAL

## 2025-03-28 VITALS — SYSTOLIC BLOOD PRESSURE: 106 MMHG | HEIGHT: 66 IN | BODY MASS INDEX: 28 KG/M2 | DIASTOLIC BLOOD PRESSURE: 68 MMHG

## 2025-03-28 NOTE — TELEPHONE ENCOUNTER
Incoming call from patient requesting to be added this morning to  schedule .  Per patient  told her that she will be seen today at 9 am.    Patient has been added per .

## 2025-03-28 NOTE — PROGRESS NOTES
RETURN GYN OFFICE VISIT  [facility]    CHIEF COMPLAINT:    Chief Complaint   Patient presents with    Vaginal Problem     Pt states she think she tore vaginal laceration last night, pt states some vaginal bleeding and a bit more soreness with some burnining       HISTORY OF PRESENT ILLNESS:    LOAN is a 39 year old female  here for concern about OB laceation.  She held a sneeze in bc she didn't want to sneeze on the baby and she felt a pop, bleeding increased, and burning increased. Is concerns her stitches broke upen.    Otherwise feeling good. Not sleeping much bc baby is feeding very frequently.      REVIEW OF SYSTEMS:   CONSTITUTIONAL:  negative for fevers, chills, sweats and fatigue  GASTROINTESTINAL:  negative for nausea, vomiting, blood in stool, constipation, diarrhea and abdominal pain  GENITOURINARY: negative for no dysuria, urgency or frequency; no urinary incontinence; no hematuria  SKIN:  negative for  rash, skin lesion and pruritus  ENDOCRINE:  negative for acne, fatigue, weight gain and weight loss  BEHAVIOR/PSYCH:  negative for depressed mood, anhedonia and anxiety    CURRENT MEDICATIONS:      Current Outpatient Medications:     ibuprofen 600 MG Oral Tab, Take 1 tablet (600 mg total) by mouth every 6 (six) hours as needed for Pain., Disp: 60 tablet, Rfl: 0    acetaminophen 325 MG Oral Tab, Take 1 tablet (325 mg total) by mouth every 6 (six) hours as needed for Pain., Disp: 60 tablet, Rfl: 0    docusate sodium 100 MG Oral Cap, Take 1 capsule (100 mg total) by mouth 2 (two) times daily as needed., Disp: 60 capsule, Rfl: 0    polyethylene glycol, PEG 3350, 17 g Oral Powd Pack, Take 17 g by mouth 2 (two) times daily as needed., Disp: 60 each, Rfl: 0    magnesium 250 MG Oral Tab, Take 1 tablet (250 mg total) by mouth., Disp: , Rfl:     Ferrous Gluconate (IRON 27 OR), Take by mouth., Disp: , Rfl:     prenatal vitamin with DHA 27-0.8-228 MG Oral Cap, Take 1 capsule by mouth daily., Disp: ,  Rfl:     Cholecalciferol (VITAMIN D) 50 MCG ( UT) Oral Cap, Take by mouth., Disp: , Rfl:     metoclopramide (REGLAN) 10 MG Oral Tab, Take 1 tablet (10 mg total) by mouth every 6 (six) hours as needed. (Patient not taking: Reported on 3/28/2025), Disp: 30 tablet, Rfl: 3    fluticasone propionate 50 MCG/ACT Nasal Suspension, 2 sprays by Each Nare route daily., Disp: 18 mL, Rfl: 0    PAST MEDICAL, SOCIAL AND FAMILY HISTORY:    Past Medical History:    Asthma (HCC)    Lyme disease    As a teenager     Past Surgical History:   Procedure Laterality Date      2022    Due to breach position     Family History   Problem Relation Age of Onset    Hypertension Father     Pulmonary Disease Father         COPD    Renal Disease Father     Asthma Mother     Other (Nystagmus) Son     Other (Persistent fetal vasculature) Son     Depression Maternal Grandmother     Psoriasis Brother      Social History     Socioeconomic History    Marital status:    Tobacco Use    Smoking status: Never    Smokeless tobacco: Never   Vaping Use    Vaping status: Never Used   Substance and Sexual Activity    Alcohol use: Not Currently     Comment: Very rarely do i have some alcohol    Drug use: Never    Sexual activity: Yes     Partners: Male   Other Topics Concern    Caffeine Concern No    Special Diet No    Weight Concern No    Exercise Yes     Comment: Once or twice a week    Blood Transfusions No    Seat Belt No           PHYSICAL EXAM:   Patient's last menstrual period was 2024 (exact date).; Body mass index is 28.11 kg/m².      CONSTITUTIONAL:  Awake, alert, cooperative, no apparent distress  EYES: sclera clear and conjunctiva normal  GASTROINTESTINAL:  soft, non-distended, non-tender, no masses palpated  GENITAL/URINARY:    External Genitalia:  General appearance; normal, Hair distribution; normal, Lesions absent   Perineum:  laceration repair site intact with no erythema. Vaginal portion also intact with no  bleeding from either site.  SKIN:  No rashes  PSYCH:  Affect Normal      ASSESSMENT AND PLAN:  1. Obstetrical laceration (HCC)  Patient reassured that exam is normal and laceration repair is healing well.  Cont to monitor.        Yani Kwon, DO

## 2025-03-28 NOTE — TELEPHONE ENCOUNTER
Telephone call:    Patient paged due to possible vaginal laceration from delivery opening.  Patient stated that she held in a sneeze and felt a pop like sensation as well as more burning and fresh blood.  Patient denied soaking a pad per hour or having a significant amount of bleeding from the area.  Patient otherwise feels well.    Patient was informed to call the office in the morning to be seen in clinic tomorrow.    Dr. Craig Molina MD    [facility]     This note was created by [employer] voice recognition. Errors in content may be related to improper recognition by the system; efforts to review and correct have been done but errors may still exist. Please be advised the primary purpose of this note is for me to communicate medical care. Standard sentence structure is not always used. Medical terminology and medical abbreviations may be used. There may be grammatical, typographical, and automated fill ins that may have errors missed in proofreading.

## 2025-04-01 ENCOUNTER — NURSE ONLY (OUTPATIENT)
Dept: OBGYN CLINIC | Facility: CLINIC | Age: 40
End: 2025-04-01
Payer: COMMERCIAL

## 2025-04-01 ENCOUNTER — TELEPHONE (OUTPATIENT)
Dept: OBGYN CLINIC | Facility: CLINIC | Age: 40
End: 2025-04-01

## 2025-04-01 DIAGNOSIS — N39.0 URINARY TRACT INFECTION WITHOUT HEMATURIA, SITE UNSPECIFIED: Primary | ICD-10-CM

## 2025-04-01 LAB
APPEARANCE: CLEAR
BILIRUBIN: NEGATIVE
GLUCOSE (URINE DIPSTICK): NEGATIVE MG/DL
KETONES (URINE DIPSTICK): NEGATIVE MG/DL
MULTISTIX LOT#: ABNORMAL NUMERIC
NITRITE, URINE: NEGATIVE
PH, URINE: 5.5 (ref 4.5–8)
PROTEIN (URINE DIPSTICK): NEGATIVE MG/DL
SPECIFIC GRAVITY: 1 (ref 1–1.03)
URINE-COLOR: YELLOW
UROBILINOGEN,SEMI-QN: 0.2 MG/DL (ref 0–1.9)

## 2025-04-01 PROCEDURE — 87086 URINE CULTURE/COLONY COUNT: CPT | Performed by: OBSTETRICS & GYNECOLOGY

## 2025-04-01 PROCEDURE — 81002 URINALYSIS NONAUTO W/O SCOPE: CPT | Performed by: OBSTETRICS & GYNECOLOGY

## 2025-04-01 RX ORDER — NITROFURANTOIN 25; 75 MG/1; MG/1
100 CAPSULE ORAL 2 TIMES DAILY
Qty: 14 CAPSULE | Refills: 0 | Status: SHIPPED | OUTPATIENT
Start: 2025-04-01 | End: 2025-04-08

## 2025-04-01 NOTE — TELEPHONE ENCOUNTER
Received a call from patient to inform she deliver a week ago and she is experiencing some UTI symptoms.  Per patient she will to be seen .    Please assist .

## 2025-04-01 NOTE — TELEPHONE ENCOUNTER
Called pt c/o uti urgency, burning and unable to void right away. Delivered last week.  Scheduled nurse visit for ua and culture, agrees.

## 2025-04-01 NOTE — PROGRESS NOTES
Per [facility], Dr. Stubbs waiting for culture due to pt's medicine allergies and breastfeeding.  Pt was informed.

## 2025-04-11 ENCOUNTER — TELEPHONE (OUTPATIENT)
Dept: OBGYN UNIT | Facility: HOSPITAL | Age: 40
End: 2025-04-11

## 2025-05-06 ENCOUNTER — POSTPARTUM (OUTPATIENT)
Dept: OBGYN CLINIC | Facility: CLINIC | Age: 40
End: 2025-05-06
Payer: COMMERCIAL

## 2025-05-06 VITALS
HEIGHT: 66 IN | SYSTOLIC BLOOD PRESSURE: 114 MMHG | DIASTOLIC BLOOD PRESSURE: 66 MMHG | WEIGHT: 153.19 LBS | BODY MASS INDEX: 24.62 KG/M2

## 2025-05-06 DIAGNOSIS — Z30.09 FAMILY PLANNING COUNSELING: ICD-10-CM

## 2025-05-06 DIAGNOSIS — Z13.31 POSITIVE DEPRESSION SCREENING: ICD-10-CM

## 2025-05-06 NOTE — PROGRESS NOTES
[facility]  Obstetrics and Gynecology   Postpartum Progress Note    Subjective:     Nedra Jacobo is a 39 year old  female who is s/p  on 3/25/25.   Her pregnancy was complicated by previous  section, AMA. She reports doing well. Baby doing well and breast feeding. The patient reports vagina bleeding. The patient denies emotional concerns.     Review of Systems:  General:  denies fevers, chills, fatigue and malaise.   Respiratory:  denies SOB, dyspnea, cough or wheezing  Cardiovascular:  denies chest pain, palpitations, exercise intolerance   GI: denies abdominal pain, diarrhea, constipation  :  denies dysuria, hematuria, increased urinary frequency.  denies abnormal uterine bleeding or vaginal discharge.       Objective:     Vitals:    25 1100   BP: 114/66   Weight: 153 lb 3.2 oz (69.5 kg)   Height: 66\"         Body mass index is 24.73 kg/m².    GENERAL: well developed, well nourished, in no apparent distress, alert and orientated X 3  PSYCH: mood and affect stable   SKIN: no rashes, no lesions  HEENT: normal  LUNGS: respiration unlabored  CARDIOVASCULAR: no peripheral edema or varicosities, skin warm and dry   ABDOMEN: Soft, non distended; non tender, no masses  GYNE/:   External Genitalia: normal, no lesions, good perineal support  Urethra: meatus normal   Bladder: well supported  Vagina: normal mucosa, no lesions,  discharge   Uterus: normal size, mobile, nontender  Cervix: normal os, no lesions or bleeding  Adnexa:normal size, bilaterally nontender, no palpable masses  Cul-de-sac: normal  R/V: normal perineum, no hemorrhoids  EXTREMITIES:  Normal range of motion, strength 5/5, nontender without edema     Labs:       EPDS 16 - does have therapist and denies SI/HI  Feels like she cries all the time.     Feels that she would benefit from longer short term disability leave to deal with postpartum mood issues.     Assessment:     Nedra Jacobo is a 39 year  old  female who presents for postpartum visit   Problem List[1]    ICD-10-CM    1. Encounter for postpartum visit ([facility])  Z39.2       2. Family planning counseling  Z30.09       3. Postpartum mood disturbance ([facility])  O90.6       4. Positive depression screening  Z13.31             Plan:     Postpartum exam   - s/p /  - doing well, no complaints   - no abnormal findings on physical exam   - may return to normal activity   - requesting note to extend short term disability to deal with postpartum mood changes/possible postpartum depression. Recommend 2 month extension. Plans to follow up with therapist.     Contraception counseling   - discussion held with patient about family planning and contraception  - pt desires condoms for now.    All of the findings and plan were discussed with the patient.  She notes understanding and agrees with the plan of care.  All questions were answered to the best of my ability at this time.    RTC in  6 -12 months for well woman exam or sooner if needed     Daina Stubbs MD         [1]   Patient Active Problem List  Diagnosis    Previous  delivery affecting pregnancy, delivered ([facility])    Antepartum multigravida of advanced maternal age ([facility])    Supervision of other normal pregnancy, antepartum ([facility])    Pregnancy ([facility])     (vaginal birth after ) ([facility])    Rh negative, antepartum ([facility])

## 2025-05-08 ENCOUNTER — TELEPHONE (OUTPATIENT)
Dept: OBGYN CLINIC | Facility: CLINIC | Age: 40
End: 2025-05-08

## 2025-05-08 NOTE — TELEPHONE ENCOUNTER
Called patient. Patient requesting in order to process [employer] extension, appt notes and letter must include a specific duration. Pt stated she discussed with Dr. Stubbs on extending her leave for 2 additional months. Needs to completed as soon as possible per pt. Informed will forward to Dr. Stubbs to review. Pt verbalized understanding

## 2025-05-08 NOTE — TELEPHONE ENCOUNTER
Incoming call from patient who had her post partum visit on 5/6/25 and she and Dr Stubbs spoke about extending her maternity leave from work. Patient would like her appointment notes updated to reflect their conversation so she can send FMLA paperwork in to be filled out.     Please assist.

## 2025-05-08 NOTE — TELEPHONE ENCOUNTER
Patient called to get email info to send forms to extend her disability patient has letter in chart, 5/6/25.  Patient said her provider said she should take 8 additional weeks. Patient said her employer gave her new forms to be done.       Type of Leave: disability extension.   Reason for Leave:pregnancy/baby bonding time  Start date of leave:5/20/25  End date of leave: 8wks  How many flare ups per month/length?:  How many appts per month/length?:   Was Fee and Turnaround info Given?:

## 2025-05-08 NOTE — TELEPHONE ENCOUNTER
Letter provided and updated appt notes. Called patient to notify. Patient verbalized understanding

## 2025-05-12 ENCOUNTER — TELEPHONE (OUTPATIENT)
Dept: OBGYN CLINIC | Facility: CLINIC | Age: 40
End: 2025-05-12

## 2025-05-12 ENCOUNTER — PATIENT MESSAGE (OUTPATIENT)
Dept: OBGYN CLINIC | Facility: CLINIC | Age: 40
End: 2025-05-12

## 2025-05-12 DIAGNOSIS — M62.89 PELVIC FLOOR INSTABILITY: Primary | ICD-10-CM

## 2025-05-12 NOTE — TELEPHONE ENCOUNTER
Left message to call back    Pt called c/o back pain, just wants pelvic floor back in order.  Informed pt to send my chart message with provider's name, address and fax, once OB provides approval RN can send referral.  Pt agrees.    Per Dr. Evelyne Caceres for referral  Thanks    Michelle Doherty at [facility] in [place]   [phone] (phone)  [phone] (fax)

## 2025-05-12 NOTE — TELEPHONE ENCOUNTER
Received a call from patient requesting physical therapy for postpartum .    Please assist with order .

## 2025-05-12 NOTE — TELEPHONE ENCOUNTER
Disability Extension Forms Received Via e-mail. Valid Authorization on File. Logged for processing.

## 2025-05-28 ENCOUNTER — TELEPHONE (OUTPATIENT)
Dept: OBGYN CLINIC | Facility: CLINIC | Age: 40
End: 2025-05-28

## 2025-06-27 ENCOUNTER — TELEPHONE (OUTPATIENT)
Dept: OBGYN CLINIC | Facility: CLINIC | Age: 40
End: 2025-06-27

## 2025-07-02 ENCOUNTER — TELEPHONE (OUTPATIENT)
Dept: OBGYN CLINIC | Facility: CLINIC | Age: 40
End: 2025-07-02

## 2025-07-02 NOTE — TELEPHONE ENCOUNTER
Received a call from patient to inform she might have a umbilical hernia  and want to know if she needs to be seen or if there is something she needs to do .    Please assist .

## 2025-07-02 NOTE — TELEPHONE ENCOUNTER
Called pt c/o umbilical hernia, sharp pain \"needle pricks/pressure\"  belly button looks different, soft area, pushing tissue back.

## 2025-07-07 ENCOUNTER — OFFICE VISIT (OUTPATIENT)
Facility: CLINIC | Age: 40
End: 2025-07-07
Payer: COMMERCIAL

## 2025-07-07 VITALS
BODY MASS INDEX: 24.59 KG/M2 | DIASTOLIC BLOOD PRESSURE: 72 MMHG | WEIGHT: 153 LBS | HEART RATE: 67 BPM | HEIGHT: 66 IN | SYSTOLIC BLOOD PRESSURE: 108 MMHG | TEMPERATURE: 97 F | OXYGEN SATURATION: 98 %

## 2025-07-07 DIAGNOSIS — K42.9 UMBILICAL HERNIA WITHOUT OBSTRUCTION AND WITHOUT GANGRENE: Primary | ICD-10-CM

## 2025-07-07 PROCEDURE — 99213 OFFICE O/P EST LOW 20 MIN: CPT | Performed by: FAMILY MEDICINE

## 2025-07-07 NOTE — PROGRESS NOTES
CC:    Chief Complaint   Patient presents with    Hernia     Pt is here for possible umbilical hernia, pt states a little discomfort around belly area        HPI: 40 year old postpartum female here for evaluation of possible umbilical hernia.   Struggled with some postpartum depression so she extended her medical leave.  She meets with a therapist every other week, and it has helped.  Getting out more and exercising has been helpful.   Had her son in 2022 via , and her belly button has looked different ever since.  About two weeks ago she noticed a pain around her belly button and an area of protrusion that she can push back in at times.  She just had her second child via vaginal delivery in March.   Also feels pressure in the area.   Notices it more with doing planks.   Does not notice pain with lifting her children.     ROS:  General:  No fever, no fatigue, no weight changes  GI:  No N/V/D, no constipation, umbilical pressure   :  No discharge, no dysuria, no polyuria, no hematuria, stress incontinence   Dermatologic:  No rashes  Psych: improved postpartum depression and anxiety     Past Medical History[1]    Social History     Socioeconomic History    Marital status:      Spouse name: Not on file    Number of children: Not on file    Years of education: Not on file    Highest education level: Not on file   Occupational History    Not on file   Tobacco Use    Smoking status: Never     Passive exposure: Never    Smokeless tobacco: Never   Vaping Use    Vaping status: Never Used   Substance and Sexual Activity    Alcohol use: Not Currently     Comment: Very rarely do i have some alcohol    Drug use: Never    Sexual activity: Yes     Partners: Male   Other Topics Concern    Caffeine Concern No    Special Diet No    Weight Concern No    Exercise Yes     Comment: Once or twice a week     Service Not Asked    Blood Transfusions No    Occupational Exposure Not Asked    Hobby Hazards Not  Asked    Sleep Concern Not Asked    Stress Concern Not Asked    Back Care Not Asked    Bike Helmet Not Asked    Seat Belt No    Self-Exams Not Asked   Social History Narrative    Not on file     Social Drivers of Health     Food Insecurity: No Food Insecurity (3/24/2025)    NCSS - Food Insecurity     Worried About Running Out of Food in the Last Year: No     Ran Out of Food in the Last Year: No   Transportation Needs: No Transportation Needs (3/24/2025)    NCSS - Transportation     Lack of Transportation: No   Stress: No Stress Concern Present (3/24/2025)    Stress     Feeling of Stress : No   Housing Stability: Not At Risk (3/24/2025)    NCSS - Housing/Utilities     Has Housing: Yes     Worried About Losing Housing: No     Unable to Get Utilities: No   Recent Concern: Housing Stability - At Risk (3/24/2025)    NCSS - Housing/Utilities     Has Housing: Yes     Worried About Losing Housing: No     Unable to Get Utilities: Yes       Current Medications[2]    Allegra [fexofenadine], Antihistamine & nasal deconges [fexofenadine-pseudoephedrine], Levaquin, and Bactrim [sulfamethoxazole w/trimethoprim]      Vitals:   Vitals:    07/07/25 0832   BP: 108/72   Pulse: 67   Temp: 97.3 °F (36.3 °C)   TempSrc: Tympanic   SpO2: 98%   Weight: 153 lb (69.4 kg)   Height: 5' 6\" (1.676 m)       Body mass index is 24.69 kg/m².    Physical:  General:  Alert, appropriate, no acute distress   GI:  Soft, positive bowel sounds, non-tender, no masses, no guarding, no rebound  Dermatologic:  No rashes or lesions    Assessment and Plan: 40 year old female here for evaluation of small umbilical hernia.    1. Umbilical hernia without obstruction and without gangrene    - Very small and unlikely to need surgery, but referral to surgeon given for further evaluation and recommendations  - Advised avoiding heavy lifting and straining, and reviewed warning signs and ED precautions   - Surgery Referral - In Network    2. Postpartum depression    -  Improving with therapy, and will notify me if anything worsens or not feeling ready to return to work as scheduled      Tosha Liu DO  07/07/25  8:40 AM         [1]   Past Medical History:   Asthma (HCC)    Lyme disease    As a teenager   [2]   Current Outpatient Medications   Medication Sig Dispense Refill    conjugated estrogens 0.625 MG/GM Vaginal Cream Place 0.5 g vaginally daily. 30 g 0    magnesium 250 MG Oral Tab Take 1 tablet (250 mg total) by mouth. (Patient not taking: Reported on 5/6/2025)      Ferrous Gluconate (IRON 27 OR) Take by mouth.      prenatal vitamin with DHA 27-0.8-228 MG Oral Cap Take 1 capsule by mouth daily.      Cholecalciferol (VITAMIN D) 50 MCG (2000 UT) Oral Cap Take by mouth.

## 2025-08-25 ENCOUNTER — OFFICE VISIT (OUTPATIENT)
Facility: CLINIC | Age: 40
End: 2025-08-25

## 2025-08-25 VITALS
OXYGEN SATURATION: 98 % | WEIGHT: 149 LBS | BODY MASS INDEX: 23.95 KG/M2 | DIASTOLIC BLOOD PRESSURE: 76 MMHG | SYSTOLIC BLOOD PRESSURE: 118 MMHG | HEART RATE: 85 BPM | HEIGHT: 66 IN

## 2025-08-25 DIAGNOSIS — R20.0 NUMBNESS OF LEFT THUMB: ICD-10-CM

## 2025-08-25 DIAGNOSIS — Z12.31 VISIT FOR SCREENING MAMMOGRAM: ICD-10-CM

## 2025-08-25 DIAGNOSIS — Z00.00 ENCOUNTER FOR ROUTINE ADULT HEALTH EXAMINATION WITHOUT ABNORMAL FINDINGS: Primary | ICD-10-CM

## 2025-08-25 DIAGNOSIS — J34.89 INTERNAL NASAL LESION: ICD-10-CM

## 2025-08-25 PROBLEM — O26.899 RH NEGATIVE, ANTEPARTUM (HCC): Status: RESOLVED | Noted: 2025-03-25 | Resolved: 2025-08-25

## 2025-08-25 PROBLEM — O34.219 VBAC (VAGINAL BIRTH AFTER CESAREAN) (HCC): Status: RESOLVED | Noted: 2025-03-25 | Resolved: 2025-08-25

## 2025-08-25 PROBLEM — Z34.90 PREGNANCY (HCC): Status: RESOLVED | Noted: 2025-03-24 | Resolved: 2025-08-25

## 2025-08-25 PROBLEM — O09.529 ANTEPARTUM MULTIGRAVIDA OF ADVANCED MATERNAL AGE (HCC): Status: RESOLVED | Noted: 2024-12-02 | Resolved: 2025-08-25

## 2025-08-25 PROBLEM — Z67.91 RH NEGATIVE, ANTEPARTUM (HCC): Status: RESOLVED | Noted: 2025-03-25 | Resolved: 2025-08-25

## 2025-08-25 PROBLEM — O34.219 PREVIOUS CESAREAN DELIVERY AFFECTING PREGNANCY, DELIVERED (HCC): Status: RESOLVED | Noted: 2024-08-19 | Resolved: 2025-08-25

## 2025-08-25 PROBLEM — Z34.80 SUPERVISION OF OTHER NORMAL PREGNANCY, ANTEPARTUM (HCC): Status: RESOLVED | Noted: 2025-01-02 | Resolved: 2025-08-25

## 2025-08-25 PROCEDURE — 99396 PREV VISIT EST AGE 40-64: CPT | Performed by: FAMILY MEDICINE

## 2025-08-25 RX ORDER — MUPIROCIN 2 %
1 OINTMENT (GRAM) TOPICAL 3 TIMES DAILY
Qty: 30 G | Refills: 0 | Status: SHIPPED | OUTPATIENT
Start: 2025-08-25 | End: 2025-09-04

## 2025-08-26 ENCOUNTER — LAB ENCOUNTER (OUTPATIENT)
Dept: LAB | Facility: REFERENCE LAB | Age: 40
End: 2025-08-26
Attending: FAMILY MEDICINE

## 2025-08-26 DIAGNOSIS — Z00.00 ENCOUNTER FOR ROUTINE ADULT HEALTH EXAMINATION WITHOUT ABNORMAL FINDINGS: ICD-10-CM

## 2025-08-26 LAB
ALBUMIN SERPL-MCNC: 4.6 G/DL (ref 3.2–4.8)
ALBUMIN/GLOB SERPL: 1.8 (ref 1–2)
ALP LIVER SERPL-CCNC: 86 U/L (ref 37–98)
ALT SERPL-CCNC: 13 U/L (ref 10–49)
ANION GAP SERPL CALC-SCNC: 6 MMOL/L (ref 0–18)
AST SERPL-CCNC: 15 U/L (ref ?–34)
BASOPHILS # BLD AUTO: 0.08 X10(3) UL (ref 0–0.2)
BASOPHILS NFR BLD AUTO: 1.1 %
BILIRUB SERPL-MCNC: 0.9 MG/DL (ref 0.3–1.2)
BUN BLD-MCNC: 13 MG/DL (ref 9–23)
BUN/CREAT SERPL: 14.9 (ref 10–20)
CALCIUM BLD-MCNC: 9.4 MG/DL (ref 8.7–10.4)
CHLORIDE SERPL-SCNC: 107 MMOL/L (ref 98–112)
CHOLEST SERPL-MCNC: 200 MG/DL (ref ?–200)
CO2 SERPL-SCNC: 28 MMOL/L (ref 21–32)
CREAT BLD-MCNC: 0.87 MG/DL (ref 0.55–1.02)
DEPRECATED RDW RBC AUTO: 44.5 FL (ref 35.1–46.3)
EGFRCR SERPLBLD CKD-EPI 2021: 86 ML/MIN/1.73M2 (ref 60–?)
EOSINOPHIL # BLD AUTO: 0.08 X10(3) UL (ref 0–0.7)
EOSINOPHIL NFR BLD AUTO: 1.1 %
ERYTHROCYTE [DISTWIDTH] IN BLOOD BY AUTOMATED COUNT: 13.2 % (ref 11–15)
EST. AVERAGE GLUCOSE BLD GHB EST-MCNC: 114 MG/DL (ref 68–126)
FASTING PATIENT LIPID ANSWER: NO
FASTING STATUS PATIENT QL REPORTED: NO
GLOBULIN PLAS-MCNC: 2.6 G/DL (ref 2–3.5)
GLUCOSE BLD-MCNC: 89 MG/DL (ref 70–99)
HBA1C MFR BLD: 5.6 % (ref ?–5.7)
HCT VFR BLD AUTO: 37.9 % (ref 35–48)
HDLC SERPL-MCNC: 60 MG/DL (ref 40–59)
HGB BLD-MCNC: 12.5 G/DL (ref 12–16)
IMM GRANULOCYTES # BLD AUTO: 0.01 X10(3) UL (ref 0–1)
IMM GRANULOCYTES NFR BLD: 0.1 %
LDLC SERPL CALC-MCNC: 108 MG/DL (ref ?–100)
LYMPHOCYTES # BLD AUTO: 2.89 X10(3) UL (ref 1–4)
LYMPHOCYTES NFR BLD AUTO: 40.3 %
MCH RBC QN AUTO: 30.2 PG (ref 26–34)
MCHC RBC AUTO-ENTMCNC: 33 G/DL (ref 31–37)
MCV RBC AUTO: 91.5 FL (ref 80–100)
MONOCYTES # BLD AUTO: 0.51 X10(3) UL (ref 0.1–1)
MONOCYTES NFR BLD AUTO: 7.1 %
NEUTROPHILS # BLD AUTO: 3.61 X10 (3) UL (ref 1.5–7.7)
NEUTROPHILS # BLD AUTO: 3.61 X10(3) UL (ref 1.5–7.7)
NEUTROPHILS NFR BLD AUTO: 50.3 %
NONHDLC SERPL-MCNC: 140 MG/DL (ref ?–130)
OSMOLALITY SERPL CALC.SUM OF ELEC: 292 MOSM/KG (ref 275–295)
PLATELET # BLD AUTO: 219 10(3)UL (ref 150–450)
PLATELETS.RETICULATED NFR BLD AUTO: 16.7 % (ref 0–7)
POTASSIUM SERPL-SCNC: 4.1 MMOL/L (ref 3.5–5.1)
PROT SERPL-MCNC: 7.2 G/DL (ref 5.7–8.2)
RBC # BLD AUTO: 4.14 X10(6)UL (ref 3.8–5.3)
SODIUM SERPL-SCNC: 141 MMOL/L (ref 136–145)
TRIGL SERPL-MCNC: 183 MG/DL (ref 30–149)
VLDLC SERPL CALC-MCNC: 31 MG/DL (ref 0–30)
WBC # BLD AUTO: 7.2 X10(3) UL (ref 4–11)

## 2025-08-26 PROCEDURE — 36415 COLL VENOUS BLD VENIPUNCTURE: CPT

## 2025-08-26 PROCEDURE — 85025 COMPLETE CBC W/AUTO DIFF WBC: CPT

## 2025-08-26 PROCEDURE — 80061 LIPID PANEL: CPT

## 2025-08-26 PROCEDURE — 83036 HEMOGLOBIN GLYCOSYLATED A1C: CPT

## 2025-08-26 PROCEDURE — 80053 COMPREHEN METABOLIC PANEL: CPT

## (undated) NOTE — LETTER
05/06/25    To Whom This May Concern:    Ms. Nedra Jacobo delivered her baby on 3/25/25. She was seen today for her postpartum visit. During that visit she was found to have a positive depression screen. I am recommending to extend her postpartum leave due to continued management of her postpartum mood disturbance with her therapist and to assess for need for treatment. Thank you in advance for your consideration.    Sincerely,      Daina Stubbs MD

## (undated) NOTE — LETTER
[facility]  Administration of Anesthesia  INedra agree to be cared for by a physician anesthesiologist alone and/or with a nurse anesthetist, who is specially trained to monitor me and give me medicine to put me to sleep or keep me comfortable during my procedure    I understand that my anesthesiologist and/or anesthetist is not an employee or agent of [facility] or [employer]. He or she works for [facility]    As the patient asking for anesthesia services, I agree to:  Allow the anesthesiologist (anesthesia doctor) to give me medicine and do additional procedures as necessary. Some examples are: Starting or using an “IV” to give me medicine, fluids or blood during my procedure, and having a breathing tube placed to help me breathe when I’m asleep (intubation). In the event that my heart stops working properly, I understand that my anesthesiologist will make every effort to sustain my life, unless otherwise directed by [facility] Do Not Resuscitate documents.  Tell my anesthesia doctor before my procedure:  If I am pregnant.  The last time that I ate or drank.  iii. All of the medicines I take (including prescriptions, herbal supplements, and pills I can buy without a prescription (including street drugs/illegal medications). Failure to inform my anesthesiologist about these medicines may increase my risk of anesthetic complications.  iv.If I am allergic to anything or have had a reaction to anesthesia before.  I understand how the anesthesia medicine will help me (benefits).  I understand that with any type of anesthesia medicine there are risks:  The most common risks are: nausea, vomiting, sore throat, muscle soreness, damage to my eyes, mouth, or teeth (from breathing tube placement).  Rare risks include: remembering what happened during my procedure, allergic reactions to medications, injury to my airway, heart, lungs, vision, nerves, or  muscles and in extremely rare instances death.  My doctor has explained to me other choices available to me for my care (alternatives).  Pregnant Patients (“epidural”):  I understand that the risks of having an epidural (medicine given into my back to help control pain during labor), include itching, low blood pressure, difficulty urinating, headache or slowing of the baby’s heart. Very rare risks include infection, bleeding, seizure, irregular heart rhythms and nerve injury.  Regional Anesthesia (“spinal”, “epidural”, & “nerve blocks”):  I understand that rare but potential complications include headache, bleeding, infection, seizure, irregular heart rhythms, and nerve injury.    _____________________________________________________________________________  Patient (or Representative) Signature/Relationship to Patient  Date   Time    _____________________________________________________________________________   Name (if used)    Language/Organization   Time    _____________________________________________________________________________  Nurse Anesthetist Signature     Date   Time  _____________________________________________________________________________  Anesthesiologist Signature     Date   Time  I have discussed the procedure and information above with the patient (or patient’s representative) and answered their questions. The patient or their representative has agreed to have anesthesia services.    _____________________________________________________________________________  Witness        Date   Time  I have verified that the signature is that of the patient or patient’s representative, and that it was signed before the procedure  Patient Name: Nedra Jacobo     : 1985                 Printed: 3/24/2025 at 8:29 AM    Medical Record #: [id number]                                            Page 1 of 1  ----------ANESTHESIA CONSENT----------

## (undated) NOTE — LETTER
02/27/25    To Whom It May Concern,     Nedra Jacobo is currently under our care for her pregnancy. Her Due date is 4/3/25. This will not change.     Sincerely,          Dr. Craig Molina MD    [facility]     This note was created by Telly voice recognition. Errors in content may be related to improper recognition by the system; efforts to review and correct have been done but errors may still exist. Please be advised the primary purpose of this note is for me to communicate medical care. Standard sentence structure is not always used. Medical terminology and medical abbreviations may be used. There may be grammatical, typographical, and automated fill ins that may have errors missed in proofreading.